# Patient Record
Sex: FEMALE | Race: WHITE | NOT HISPANIC OR LATINO | URBAN - METROPOLITAN AREA
[De-identification: names, ages, dates, MRNs, and addresses within clinical notes are randomized per-mention and may not be internally consistent; named-entity substitution may affect disease eponyms.]

---

## 2018-04-18 ENCOUNTER — OFFICE VISIT CONVERTED (OUTPATIENT)
Dept: GASTROENTEROLOGY | Facility: CLINIC | Age: 58
End: 2018-04-18
Attending: INTERNAL MEDICINE

## 2018-04-18 ENCOUNTER — CONVERSION ENCOUNTER (OUTPATIENT)
Dept: GASTROENTEROLOGY | Facility: CLINIC | Age: 58
End: 2018-04-18

## 2021-05-16 VITALS
SYSTOLIC BLOOD PRESSURE: 127 MMHG | RESPIRATION RATE: 12 BRPM | WEIGHT: 198.06 LBS | OXYGEN SATURATION: 99 % | DIASTOLIC BLOOD PRESSURE: 71 MMHG | HEIGHT: 62 IN | BODY MASS INDEX: 36.45 KG/M2 | HEART RATE: 95 BPM

## 2025-07-29 ENCOUNTER — APPOINTMENT (OUTPATIENT)
Dept: CT IMAGING | Facility: HOSPITAL | Age: 65
End: 2025-07-29
Payer: COMMERCIAL

## 2025-07-29 ENCOUNTER — HOSPITAL ENCOUNTER (INPATIENT)
Facility: HOSPITAL | Age: 65
LOS: 3 days | Discharge: HOME | End: 2025-08-01
Attending: EMERGENCY MEDICINE | Admitting: INTERNAL MEDICINE
Payer: COMMERCIAL

## 2025-07-29 ENCOUNTER — ANESTHESIA EVENT (OUTPATIENT)
Dept: PERIOP | Facility: HOSPITAL | Age: 65
End: 2025-07-29
Payer: COMMERCIAL

## 2025-07-29 ENCOUNTER — ANESTHESIA (OUTPATIENT)
Dept: PERIOP | Facility: HOSPITAL | Age: 65
End: 2025-07-29
Payer: COMMERCIAL

## 2025-07-29 DIAGNOSIS — Z78.9 DECREASED ACTIVITIES OF DAILY LIVING (ADL): ICD-10-CM

## 2025-07-29 DIAGNOSIS — R26.2 DIFFICULTY WALKING: ICD-10-CM

## 2025-07-29 DIAGNOSIS — K42.0 INCARCERATED UMBILICAL HERNIA: ICD-10-CM

## 2025-07-29 DIAGNOSIS — E87.20 LACTIC ACIDOSIS: ICD-10-CM

## 2025-07-29 DIAGNOSIS — K56.609 SMALL BOWEL OBSTRUCTION: Primary | ICD-10-CM

## 2025-07-29 PROBLEM — K51.90 ULCERATIVE COLITIS: Status: ACTIVE | Noted: 2025-07-29

## 2025-07-29 PROBLEM — D86.0 SARCOIDOSIS OF LUNG: Status: ACTIVE | Noted: 2025-07-29

## 2025-07-29 PROBLEM — D50.9 IRON DEFICIENCY ANEMIA: Status: ACTIVE | Noted: 2025-07-29

## 2025-07-29 PROBLEM — R16.0 LARGE LIVER: Status: ACTIVE | Noted: 2025-07-29

## 2025-07-29 PROBLEM — R94.5 ABNORMAL RESULTS OF LIVER FUNCTION STUDIES: Status: ACTIVE | Noted: 2025-07-29

## 2025-07-29 PROBLEM — M25.569 KNEE PAIN: Status: ACTIVE | Noted: 2025-07-29

## 2025-07-29 PROBLEM — C44.90 PRIMARY MALIGNANT NEOPLASM OF SKIN: Status: ACTIVE | Noted: 2025-07-29

## 2025-07-29 PROBLEM — D50.0 ANEMIA DUE TO CHRONIC BLOOD LOSS: Status: ACTIVE | Noted: 2025-07-29

## 2025-07-29 PROBLEM — M19.90 ARTHRITIS: Status: ACTIVE | Noted: 2025-07-29

## 2025-07-29 PROBLEM — M25.50 JOINT PAIN: Status: ACTIVE | Noted: 2025-07-29

## 2025-07-29 LAB
ALBUMIN SERPL-MCNC: 5 G/DL (ref 3.5–5.2)
ALBUMIN/GLOB SERPL: 1.4 G/DL
ALP SERPL-CCNC: 151 U/L (ref 39–117)
ALT SERPL W P-5'-P-CCNC: 50 U/L (ref 1–33)
ANION GAP SERPL CALCULATED.3IONS-SCNC: 18.4 MMOL/L (ref 5–15)
AST SERPL-CCNC: 39 U/L (ref 1–32)
BACTERIA UR QL AUTO: ABNORMAL /HPF
BASOPHILS # BLD AUTO: 0.05 10*3/MM3 (ref 0–0.2)
BASOPHILS NFR BLD AUTO: 0.3 % (ref 0–1.5)
BILIRUB SERPL-MCNC: 0.3 MG/DL (ref 0–1.2)
BILIRUB UR QL STRIP: NEGATIVE
BUN SERPL-MCNC: 17.2 MG/DL (ref 8–23)
BUN/CREAT SERPL: 19.1 (ref 7–25)
CALCIUM SPEC-SCNC: 10.5 MG/DL (ref 8.6–10.5)
CHLORIDE SERPL-SCNC: 100 MMOL/L (ref 98–107)
CLARITY UR: CLEAR
CO2 SERPL-SCNC: 22.6 MMOL/L (ref 22–29)
COLOR UR: YELLOW
CREAT SERPL-MCNC: 0.9 MG/DL (ref 0.57–1)
D-LACTATE SERPL-SCNC: 3.6 MMOL/L (ref 0.5–2)
D-LACTATE SERPL-SCNC: 5.1 MMOL/L (ref 0.5–2)
D-LACTATE SERPL-SCNC: 5.1 MMOL/L (ref 0.5–2)
DEPRECATED RDW RBC AUTO: 47 FL (ref 37–54)
EGFRCR SERPLBLD CKD-EPI 2021: 71.1 ML/MIN/1.73
EOSINOPHIL # BLD AUTO: 0 10*3/MM3 (ref 0–0.4)
EOSINOPHIL NFR BLD AUTO: 0 % (ref 0.3–6.2)
ERYTHROCYTE [DISTWIDTH] IN BLOOD BY AUTOMATED COUNT: 14.1 % (ref 12.3–15.4)
GLOBULIN UR ELPH-MCNC: 3.5 GM/DL
GLUCOSE BLDC GLUCOMTR-MCNC: 152 MG/DL (ref 70–99)
GLUCOSE SERPL-MCNC: 166 MG/DL (ref 65–99)
GLUCOSE UR STRIP-MCNC: ABNORMAL MG/DL
HCT VFR BLD AUTO: 45.6 % (ref 34–46.6)
HGB BLD-MCNC: 14.7 G/DL (ref 12–15.9)
HGB UR QL STRIP.AUTO: NEGATIVE
HOLD SPECIMEN: NORMAL
HOLD SPECIMEN: NORMAL
HYALINE CASTS UR QL AUTO: ABNORMAL /LPF
IMM GRANULOCYTES # BLD AUTO: 0.05 10*3/MM3 (ref 0–0.05)
IMM GRANULOCYTES NFR BLD AUTO: 0.3 % (ref 0–0.5)
KETONES UR QL STRIP: ABNORMAL
LEUKOCYTE ESTERASE UR QL STRIP.AUTO: ABNORMAL
LIPASE SERPL-CCNC: 34 U/L (ref 13–60)
LYMPHOCYTES # BLD AUTO: 0.68 10*3/MM3 (ref 0.7–3.1)
LYMPHOCYTES NFR BLD AUTO: 3.8 % (ref 19.6–45.3)
MCH RBC QN AUTO: 29.5 PG (ref 26.6–33)
MCHC RBC AUTO-ENTMCNC: 32.2 G/DL (ref 31.5–35.7)
MCV RBC AUTO: 91.4 FL (ref 79–97)
MONOCYTES # BLD AUTO: 0.55 10*3/MM3 (ref 0.1–0.9)
MONOCYTES NFR BLD AUTO: 3.1 % (ref 5–12)
NEUTROPHILS NFR BLD AUTO: 16.55 10*3/MM3 (ref 1.7–7)
NEUTROPHILS NFR BLD AUTO: 92.5 % (ref 42.7–76)
NITRITE UR QL STRIP: NEGATIVE
NRBC BLD AUTO-RTO: 0 /100 WBC (ref 0–0.2)
PH UR STRIP.AUTO: <=5 [PH] (ref 5–8)
PLATELET # BLD AUTO: 423 10*3/MM3 (ref 140–450)
PMV BLD AUTO: 9.5 FL (ref 6–12)
POTASSIUM SERPL-SCNC: 5 MMOL/L (ref 3.5–5.2)
PROT SERPL-MCNC: 8.5 G/DL (ref 6–8.5)
PROT UR QL STRIP: ABNORMAL
RBC # BLD AUTO: 4.99 10*6/MM3 (ref 3.77–5.28)
RBC # UR STRIP: ABNORMAL /HPF
REF LAB TEST METHOD: ABNORMAL
SODIUM SERPL-SCNC: 141 MMOL/L (ref 136–145)
SP GR UR STRIP: >1.03 (ref 1–1.03)
SQUAMOUS #/AREA URNS HPF: ABNORMAL /HPF
TSH SERPL DL<=0.05 MIU/L-ACNC: 0.67 UIU/ML (ref 0.27–4.2)
UROBILINOGEN UR QL STRIP: ABNORMAL
WBC # UR STRIP: ABNORMAL /HPF
WBC NRBC COR # BLD AUTO: 17.88 10*3/MM3 (ref 3.4–10.8)
WHOLE BLOOD HOLD COAG: NORMAL
WHOLE BLOOD HOLD SPECIMEN: NORMAL

## 2025-07-29 PROCEDURE — 82948 REAGENT STRIP/BLOOD GLUCOSE: CPT | Performed by: NURSE ANESTHETIST, CERTIFIED REGISTERED

## 2025-07-29 PROCEDURE — 25010000002 CEFOXITIN PER 1 G: Performed by: STUDENT IN AN ORGANIZED HEALTH CARE EDUCATION/TRAINING PROGRAM

## 2025-07-29 PROCEDURE — 25010000002 SUGAMMADEX 200 MG/2ML SOLUTION: Performed by: NURSE ANESTHETIST, CERTIFIED REGISTERED

## 2025-07-29 PROCEDURE — 99285 EMERGENCY DEPT VISIT HI MDM: CPT

## 2025-07-29 PROCEDURE — 49594 RPR AA HRN 1ST 3-10 NCR/STRN: CPT

## 2025-07-29 PROCEDURE — 36415 COLL VENOUS BLD VENIPUNCTURE: CPT | Performed by: EMERGENCY MEDICINE

## 2025-07-29 PROCEDURE — 99222 1ST HOSP IP/OBS MODERATE 55: CPT | Performed by: STUDENT IN AN ORGANIZED HEALTH CARE EDUCATION/TRAINING PROGRAM

## 2025-07-29 PROCEDURE — 25010000002 LIDOCAINE 1% - EPINEPHRINE 1:100000 1 %-1:100000 SOLUTION: Performed by: STUDENT IN AN ORGANIZED HEALTH CARE EDUCATION/TRAINING PROGRAM

## 2025-07-29 PROCEDURE — 25810000003 SODIUM CHLORIDE 0.9 % SOLUTION: Performed by: EMERGENCY MEDICINE

## 2025-07-29 PROCEDURE — 25810000003 LACTATED RINGERS PER 1000 ML: Performed by: STUDENT IN AN ORGANIZED HEALTH CARE EDUCATION/TRAINING PROGRAM

## 2025-07-29 PROCEDURE — 25010000002 PROPOFOL 10 MG/ML EMULSION: Performed by: NURSE ANESTHETIST, CERTIFIED REGISTERED

## 2025-07-29 PROCEDURE — 25010000002 DEXAMETHASONE PER 1 MG: Performed by: NURSE ANESTHETIST, CERTIFIED REGISTERED

## 2025-07-29 PROCEDURE — 25010000002 FENTANYL CITRATE (PF) 50 MCG/ML SOLUTION: Performed by: NURSE ANESTHETIST, CERTIFIED REGISTERED

## 2025-07-29 PROCEDURE — 25010000002 FAMOTIDINE 10 MG/ML SOLUTION: Performed by: EMERGENCY MEDICINE

## 2025-07-29 PROCEDURE — 25010000002 MIDAZOLAM PER 1MG: Performed by: ANESTHESIOLOGY

## 2025-07-29 PROCEDURE — 81001 URINALYSIS AUTO W/SCOPE: CPT | Performed by: EMERGENCY MEDICINE

## 2025-07-29 PROCEDURE — 99223 1ST HOSP IP/OBS HIGH 75: CPT | Performed by: INTERNAL MEDICINE

## 2025-07-29 PROCEDURE — 25010000002 ONDANSETRON PER 1 MG: Performed by: EMERGENCY MEDICINE

## 2025-07-29 PROCEDURE — 25810000003 LACTATED RINGERS PER 1000 ML: Performed by: ANESTHESIOLOGY

## 2025-07-29 PROCEDURE — 25010000002 HYDROMORPHONE 1 MG/ML SOLUTION: Performed by: EMERGENCY MEDICINE

## 2025-07-29 PROCEDURE — 25010000002 PHENYLEPHRINE HCL-NACL 1000-0.9 MCG/10ML-% SOLUTION PREFILLED SYRINGE: Performed by: NURSE ANESTHETIST, CERTIFIED REGISTERED

## 2025-07-29 PROCEDURE — 83605 ASSAY OF LACTIC ACID: CPT

## 2025-07-29 PROCEDURE — 49594 RPR AA HRN 1ST 3-10 NCR/STRN: CPT | Performed by: STUDENT IN AN ORGANIZED HEALTH CARE EDUCATION/TRAINING PROGRAM

## 2025-07-29 PROCEDURE — 74177 CT ABD & PELVIS W/CONTRAST: CPT

## 2025-07-29 PROCEDURE — 25010000002 LIDOCAINE PF 2% 2 % SOLUTION: Performed by: NURSE ANESTHETIST, CERTIFIED REGISTERED

## 2025-07-29 PROCEDURE — 80050 GENERAL HEALTH PANEL: CPT | Performed by: INTERNAL MEDICINE

## 2025-07-29 PROCEDURE — 83605 ASSAY OF LACTIC ACID: CPT | Performed by: EMERGENCY MEDICINE

## 2025-07-29 PROCEDURE — 94799 UNLISTED PULMONARY SVC/PX: CPT

## 2025-07-29 PROCEDURE — 0WUF0JZ SUPPLEMENT ABDOMINAL WALL WITH SYNTHETIC SUBSTITUTE, OPEN APPROACH: ICD-10-PCS | Performed by: STUDENT IN AN ORGANIZED HEALTH CARE EDUCATION/TRAINING PROGRAM

## 2025-07-29 PROCEDURE — 83690 ASSAY OF LIPASE: CPT | Performed by: EMERGENCY MEDICINE

## 2025-07-29 PROCEDURE — 25010000002 METOCLOPRAMIDE PER 10 MG: Performed by: ANESTHESIOLOGY

## 2025-07-29 PROCEDURE — 25010000002 ONDANSETRON PER 1 MG: Performed by: NURSE ANESTHETIST, CERTIFIED REGISTERED

## 2025-07-29 PROCEDURE — 25010000002 HYDROMORPHONE 1 MG/ML SOLUTION: Performed by: NURSE ANESTHETIST, CERTIFIED REGISTERED

## 2025-07-29 PROCEDURE — 25510000001 IOPAMIDOL PER 1 ML: Performed by: EMERGENCY MEDICINE

## 2025-07-29 DEVICE — LIGACLIP MCA MULTIPLE CLIP APPLIERS, 20 LARGE CLIPS
Type: IMPLANTABLE DEVICE | Site: ABDOMEN | Status: FUNCTIONAL
Brand: LIGACLIP

## 2025-07-29 DEVICE — LIGACLIP MCA MULTIPLE CLIP APPLIERS, 20 SMALL CLIPS
Type: IMPLANTABLE DEVICE | Site: ABDOMEN | Status: FUNCTIONAL
Brand: LIGACLIP

## 2025-07-29 DEVICE — GRFT TISS STRATTICE FIRM 10X10CM: Type: IMPLANTABLE DEVICE | Site: ABDOMEN | Status: FUNCTIONAL

## 2025-07-29 RX ORDER — DEXAMETHASONE SODIUM PHOSPHATE 4 MG/ML
INJECTION, SOLUTION INTRA-ARTICULAR; INTRALESIONAL; INTRAMUSCULAR; INTRAVENOUS; SOFT TISSUE AS NEEDED
Status: DISCONTINUED | OUTPATIENT
Start: 2025-07-29 | End: 2025-07-29 | Stop reason: SURG

## 2025-07-29 RX ORDER — ACETAMINOPHEN 325 MG/1
650 TABLET ORAL EVERY 6 HOURS PRN
Status: DISCONTINUED | OUTPATIENT
Start: 2025-07-29 | End: 2025-07-30

## 2025-07-29 RX ORDER — ASPIRIN 81 MG/1
81 TABLET ORAL AS NEEDED
COMMUNITY

## 2025-07-29 RX ORDER — SCOPOLAMINE 1 MG/3D
1 PATCH, EXTENDED RELEASE TRANSDERMAL ONCE
Status: DISCONTINUED | OUTPATIENT
Start: 2025-07-29 | End: 2025-08-01 | Stop reason: HOSPADM

## 2025-07-29 RX ORDER — PROMETHAZINE HYDROCHLORIDE 25 MG/1
25 SUPPOSITORY RECTAL ONCE AS NEEDED
Status: DISCONTINUED | OUTPATIENT
Start: 2025-07-29 | End: 2025-07-29 | Stop reason: HOSPADM

## 2025-07-29 RX ORDER — PROMETHAZINE HYDROCHLORIDE 25 MG/1
25 TABLET ORAL ONCE AS NEEDED
Status: DISCONTINUED | OUTPATIENT
Start: 2025-07-29 | End: 2025-07-29 | Stop reason: HOSPADM

## 2025-07-29 RX ORDER — HYDROCODONE BITARTRATE AND ACETAMINOPHEN 5; 325 MG/1; MG/1
1 TABLET ORAL EVERY 6 HOURS PRN
Status: DISCONTINUED | OUTPATIENT
Start: 2025-07-29 | End: 2025-07-30

## 2025-07-29 RX ORDER — DEXMEDETOMIDINE HYDROCHLORIDE 100 UG/ML
INJECTION, SOLUTION INTRAVENOUS AS NEEDED
Status: DISCONTINUED | OUTPATIENT
Start: 2025-07-29 | End: 2025-07-29 | Stop reason: SURG

## 2025-07-29 RX ORDER — SODIUM CHLORIDE 0.9 % (FLUSH) 0.9 %
10 SYRINGE (ML) INJECTION AS NEEDED
Status: DISCONTINUED | OUTPATIENT
Start: 2025-07-29 | End: 2025-08-01 | Stop reason: HOSPADM

## 2025-07-29 RX ORDER — SODIUM CHLORIDE, SODIUM LACTATE, POTASSIUM CHLORIDE, CALCIUM CHLORIDE 600; 310; 30; 20 MG/100ML; MG/100ML; MG/100ML; MG/100ML
70 INJECTION, SOLUTION INTRAVENOUS CONTINUOUS
Status: DISCONTINUED | OUTPATIENT
Start: 2025-07-29 | End: 2025-07-31

## 2025-07-29 RX ORDER — DAPAGLIFLOZIN 5 MG/1
5 TABLET, FILM COATED ORAL DAILY
COMMUNITY
Start: 2025-05-29

## 2025-07-29 RX ORDER — PROPOFOL 10 MG/ML
VIAL (ML) INTRAVENOUS AS NEEDED
Status: DISCONTINUED | OUTPATIENT
Start: 2025-07-29 | End: 2025-07-29 | Stop reason: SURG

## 2025-07-29 RX ORDER — ONDANSETRON 2 MG/ML
4 INJECTION INTRAMUSCULAR; INTRAVENOUS ONCE
Status: COMPLETED | OUTPATIENT
Start: 2025-07-29 | End: 2025-07-29

## 2025-07-29 RX ORDER — NICOTINE 21 MG/24HR
1 PATCH, TRANSDERMAL 24 HOURS TRANSDERMAL DAILY PRN
Status: DISCONTINUED | OUTPATIENT
Start: 2025-07-29 | End: 2025-08-01

## 2025-07-29 RX ORDER — FENTANYL CITRATE 50 UG/ML
INJECTION, SOLUTION INTRAMUSCULAR; INTRAVENOUS AS NEEDED
Status: DISCONTINUED | OUTPATIENT
Start: 2025-07-29 | End: 2025-07-29 | Stop reason: SURG

## 2025-07-29 RX ORDER — ONDANSETRON 2 MG/ML
4 INJECTION INTRAMUSCULAR; INTRAVENOUS EVERY 4 HOURS PRN
Status: DISCONTINUED | OUTPATIENT
Start: 2025-07-29 | End: 2025-08-01 | Stop reason: HOSPADM

## 2025-07-29 RX ORDER — PHENYLEPHRINE HCL IN 0.9% NACL 1 MG/10 ML
SYRINGE (ML) INTRAVENOUS AS NEEDED
Status: DISCONTINUED | OUTPATIENT
Start: 2025-07-29 | End: 2025-07-29 | Stop reason: SURG

## 2025-07-29 RX ORDER — LIDOCAINE 4 G/G
1 PATCH TOPICAL DAILY PRN
Status: DISCONTINUED | OUTPATIENT
Start: 2025-07-29 | End: 2025-08-01 | Stop reason: HOSPADM

## 2025-07-29 RX ORDER — LIDOCAINE 5 G/100G
1 CREAM RECTAL; TOPICAL 3 TIMES DAILY PRN
Status: DISCONTINUED | OUTPATIENT
Start: 2025-07-29 | End: 2025-08-01 | Stop reason: HOSPADM

## 2025-07-29 RX ORDER — FAMOTIDINE 10 MG/ML
20 INJECTION, SOLUTION INTRAVENOUS ONCE
Status: COMPLETED | OUTPATIENT
Start: 2025-07-29 | End: 2025-07-29

## 2025-07-29 RX ORDER — ALUMINA, MAGNESIA, AND SIMETHICONE 2400; 2400; 240 MG/30ML; MG/30ML; MG/30ML
15 SUSPENSION ORAL EVERY 6 HOURS PRN
Status: DISCONTINUED | OUTPATIENT
Start: 2025-07-29 | End: 2025-07-30

## 2025-07-29 RX ORDER — HYDROXYZINE HYDROCHLORIDE 25 MG/1
25 TABLET, FILM COATED ORAL 3 TIMES DAILY PRN
Status: DISCONTINUED | OUTPATIENT
Start: 2025-07-29 | End: 2025-07-30

## 2025-07-29 RX ORDER — SODIUM CHLORIDE 9 MG/ML
40 INJECTION, SOLUTION INTRAVENOUS AS NEEDED
Status: DISCONTINUED | OUTPATIENT
Start: 2025-07-29 | End: 2025-08-01 | Stop reason: HOSPADM

## 2025-07-29 RX ORDER — ACETAMINOPHEN 500 MG
500 TABLET ORAL AS NEEDED
COMMUNITY
End: 2025-08-01 | Stop reason: HOSPADM

## 2025-07-29 RX ORDER — IOPAMIDOL 755 MG/ML
100 INJECTION, SOLUTION INTRAVASCULAR
Status: COMPLETED | OUTPATIENT
Start: 2025-07-29 | End: 2025-07-29

## 2025-07-29 RX ORDER — ACETAMINOPHEN 10 MG/ML
1000 INJECTION, SOLUTION INTRAVENOUS EVERY 8 HOURS PRN
Status: ACTIVE | OUTPATIENT
Start: 2025-07-29 | End: 2025-07-31

## 2025-07-29 RX ORDER — SODIUM CHLORIDE, SODIUM LACTATE, POTASSIUM CHLORIDE, CALCIUM CHLORIDE 600; 310; 30; 20 MG/100ML; MG/100ML; MG/100ML; MG/100ML
20 INJECTION, SOLUTION INTRAVENOUS CONTINUOUS PRN
Status: DISCONTINUED | OUTPATIENT
Start: 2025-07-29 | End: 2025-07-30

## 2025-07-29 RX ORDER — LIDOCAINE HYDROCHLORIDE AND EPINEPHRINE 10; 10 MG/ML; UG/ML
INJECTION, SOLUTION INFILTRATION; PERINEURAL AS NEEDED
Status: DISCONTINUED | OUTPATIENT
Start: 2025-07-29 | End: 2025-07-29 | Stop reason: HOSPADM

## 2025-07-29 RX ORDER — LIDOCAINE HYDROCHLORIDE 20 MG/ML
INJECTION, SOLUTION EPIDURAL; INFILTRATION; INTRACAUDAL; PERINEURAL AS NEEDED
Status: DISCONTINUED | OUTPATIENT
Start: 2025-07-29 | End: 2025-07-29 | Stop reason: SURG

## 2025-07-29 RX ORDER — SODIUM CHLORIDE 0.9 % (FLUSH) 0.9 %
10 SYRINGE (ML) INJECTION EVERY 12 HOURS SCHEDULED
Status: DISCONTINUED | OUTPATIENT
Start: 2025-07-29 | End: 2025-08-01 | Stop reason: HOSPADM

## 2025-07-29 RX ORDER — MIDAZOLAM HYDROCHLORIDE 2 MG/2ML
2 INJECTION, SOLUTION INTRAMUSCULAR; INTRAVENOUS ONCE
Status: COMPLETED | OUTPATIENT
Start: 2025-07-29 | End: 2025-07-29

## 2025-07-29 RX ORDER — OXYCODONE HYDROCHLORIDE 5 MG/1
5 TABLET ORAL
Status: DISCONTINUED | OUTPATIENT
Start: 2025-07-29 | End: 2025-07-29 | Stop reason: HOSPADM

## 2025-07-29 RX ORDER — ROCURONIUM BROMIDE 10 MG/ML
INJECTION, SOLUTION INTRAVENOUS AS NEEDED
Status: DISCONTINUED | OUTPATIENT
Start: 2025-07-29 | End: 2025-07-29 | Stop reason: SURG

## 2025-07-29 RX ORDER — ONDANSETRON 2 MG/ML
INJECTION INTRAMUSCULAR; INTRAVENOUS AS NEEDED
Status: DISCONTINUED | OUTPATIENT
Start: 2025-07-29 | End: 2025-07-29 | Stop reason: SURG

## 2025-07-29 RX ORDER — LEVOTHYROXINE SODIUM 100 MCG
100 TABLET ORAL DAILY
COMMUNITY

## 2025-07-29 RX ORDER — ONDANSETRON 2 MG/ML
4 INJECTION INTRAMUSCULAR; INTRAVENOUS ONCE AS NEEDED
Status: DISCONTINUED | OUTPATIENT
Start: 2025-07-29 | End: 2025-07-29 | Stop reason: HOSPADM

## 2025-07-29 RX ORDER — METOCLOPRAMIDE HYDROCHLORIDE 5 MG/ML
10 INJECTION INTRAMUSCULAR; INTRAVENOUS
Status: COMPLETED | OUTPATIENT
Start: 2025-07-29 | End: 2025-07-29

## 2025-07-29 RX ADMIN — HYDROMORPHONE HYDROCHLORIDE 0.5 MG: 1 INJECTION, SOLUTION INTRAMUSCULAR; INTRAVENOUS; SUBCUTANEOUS at 17:43

## 2025-07-29 RX ADMIN — PROPOFOL 10 MG: 10 INJECTION, EMULSION INTRAVENOUS at 16:29

## 2025-07-29 RX ADMIN — FAMOTIDINE 20 MG: 10 INJECTION INTRAVENOUS at 12:12

## 2025-07-29 RX ADMIN — FENTANYL CITRATE 50 MCG: 50 INJECTION, SOLUTION INTRAMUSCULAR; INTRAVENOUS at 16:10

## 2025-07-29 RX ADMIN — Medication 200 MCG: at 15:52

## 2025-07-29 RX ADMIN — FENTANYL CITRATE 50 MCG: 50 INJECTION, SOLUTION INTRAMUSCULAR; INTRAVENOUS at 15:49

## 2025-07-29 RX ADMIN — ONDANSETRON 4 MG: 2 INJECTION, SOLUTION INTRAMUSCULAR; INTRAVENOUS at 14:13

## 2025-07-29 RX ADMIN — Medication 200 MCG: at 15:55

## 2025-07-29 RX ADMIN — Medication 100 MCG: at 15:58

## 2025-07-29 RX ADMIN — METOCLOPRAMIDE 10 MG: 5 INJECTION, SOLUTION INTRAMUSCULAR; INTRAVENOUS at 15:35

## 2025-07-29 RX ADMIN — PROPOFOL 20 MG: 10 INJECTION, EMULSION INTRAVENOUS at 16:16

## 2025-07-29 RX ADMIN — CEFOXITIN 2000 MG: 2 INJECTION, POWDER, FOR SOLUTION INTRAVENOUS at 15:34

## 2025-07-29 RX ADMIN — PROPOFOL 200 MG: 10 INJECTION, EMULSION INTRAVENOUS at 15:49

## 2025-07-29 RX ADMIN — PROPOFOL 10 MG: 10 INJECTION, EMULSION INTRAVENOUS at 16:40

## 2025-07-29 RX ADMIN — SODIUM CHLORIDE, POTASSIUM CHLORIDE, SODIUM LACTATE AND CALCIUM CHLORIDE 20 ML/HR: 600; 310; 30; 20 INJECTION, SOLUTION INTRAVENOUS at 15:18

## 2025-07-29 RX ADMIN — SODIUM CHLORIDE, POTASSIUM CHLORIDE, SODIUM LACTATE AND CALCIUM CHLORIDE: 600; 310; 30; 20 INJECTION, SOLUTION INTRAVENOUS at 16:44

## 2025-07-29 RX ADMIN — SUGAMMADEX 200 MG: 100 INJECTION, SOLUTION INTRAVENOUS at 16:50

## 2025-07-29 RX ADMIN — SODIUM CHLORIDE, POTASSIUM CHLORIDE, SODIUM LACTATE AND CALCIUM CHLORIDE 100 ML/HR: 600; 310; 30; 20 INJECTION, SOLUTION INTRAVENOUS at 21:36

## 2025-07-29 RX ADMIN — DEXAMETHASONE SODIUM PHOSPHATE 8 MG: 4 INJECTION, SOLUTION INTRA-ARTICULAR; INTRALESIONAL; INTRAMUSCULAR; INTRAVENOUS; SOFT TISSUE at 15:49

## 2025-07-29 RX ADMIN — ROCURONIUM BROMIDE 100 MG: 10 INJECTION, SOLUTION INTRAVENOUS at 15:49

## 2025-07-29 RX ADMIN — TOPICAL ANESTHETIC 1 SPRAY: 200 SPRAY DENTAL; PERIODONTAL at 14:20

## 2025-07-29 RX ADMIN — DEXMEDETOMIDINE 4 MCG: 100 INJECTION, SOLUTION INTRAVENOUS at 16:15

## 2025-07-29 RX ADMIN — HYDROMORPHONE HYDROCHLORIDE 1 MG: 1 INJECTION, SOLUTION INTRAMUSCULAR; INTRAVENOUS; SUBCUTANEOUS at 12:13

## 2025-07-29 RX ADMIN — SODIUM CHLORIDE 1000 ML: 9 INJECTION, SOLUTION INTRAVENOUS at 14:20

## 2025-07-29 RX ADMIN — MIDAZOLAM HYDROCHLORIDE 2 MG: 1 INJECTION, SOLUTION INTRAMUSCULAR; INTRAVENOUS at 15:35

## 2025-07-29 RX ADMIN — ONDANSETRON 4 MG: 2 INJECTION INTRAMUSCULAR; INTRAVENOUS at 16:49

## 2025-07-29 RX ADMIN — ONDANSETRON 4 MG: 2 INJECTION, SOLUTION INTRAMUSCULAR; INTRAVENOUS at 11:58

## 2025-07-29 RX ADMIN — HYDROMORPHONE HYDROCHLORIDE 0.5 MG: 1 INJECTION, SOLUTION INTRAMUSCULAR; INTRAVENOUS; SUBCUTANEOUS at 14:13

## 2025-07-29 RX ADMIN — HYDROMORPHONE HYDROCHLORIDE 0.5 MG: 1 INJECTION, SOLUTION INTRAMUSCULAR; INTRAVENOUS; SUBCUTANEOUS at 17:14

## 2025-07-29 RX ADMIN — DEXMEDETOMIDINE 4 MCG: 100 INJECTION, SOLUTION INTRAVENOUS at 16:25

## 2025-07-29 RX ADMIN — Medication 10 ML: at 21:55

## 2025-07-29 RX ADMIN — LIDOCAINE HYDROCHLORIDE 100 MG: 20 INJECTION, SOLUTION EPIDURAL; INFILTRATION; INTRACAUDAL; PERINEURAL at 15:49

## 2025-07-29 RX ADMIN — IOPAMIDOL 95 ML: 755 INJECTION, SOLUTION INTRAVENOUS at 12:43

## 2025-07-29 RX ADMIN — DEXMEDETOMIDINE 4 MCG: 100 INJECTION, SOLUTION INTRAVENOUS at 16:29

## 2025-07-29 RX ADMIN — DEXMEDETOMIDINE 4 MCG: 100 INJECTION, SOLUTION INTRAVENOUS at 16:35

## 2025-07-29 RX ADMIN — Medication 300 MCG: at 16:03

## 2025-07-29 RX ADMIN — SODIUM CHLORIDE 1000 ML: 9 INJECTION, SOLUTION INTRAVENOUS at 11:58

## 2025-07-29 RX ADMIN — DEXMEDETOMIDINE 4 MCG: 100 INJECTION, SOLUTION INTRAVENOUS at 16:40

## 2025-07-29 NOTE — ANESTHESIA PREPROCEDURE EVALUATION
Anesthesia Evaluation     Patient summary reviewed and Nursing notes reviewed   no history of anesthetic complications:   NPO Solid Status: > 8 hours  NPO Liquid Status: > 2 hours           Airway   Mallampati: III  TM distance: >3 FB  Neck ROM: full  Possible difficult intubation  Dental      Pulmonary - negative pulmonary ROS and normal exam    breath sounds clear to auscultation  Cardiovascular - negative cardio ROS and normal exam  Exercise tolerance: good (4-7 METS)    ECG reviewed  Rhythm: regular  Rate: normal        Neuro/Psych- negative ROS  GI/Hepatic/Renal/Endo    (+) hiatal hernia, liver disease (hepatomegaly), diabetes mellitus type 2, thyroid problem hypothyroidism    Musculoskeletal     Abdominal    Substance History - negative use     OB/GYN negative ob/gyn ROS         Other   arthritis,   history of cancer (skin cancer)    ROS/Med Hx Other: PAT Nursing Notes unavailable.           Latest Reference Range & Units 07/29/25 11:03   Sodium 136 - 145 mmol/L 141   Potassium 3.5 - 5.2 mmol/L 5.0   Chloride 98 - 107 mmol/L 100   CO2 22.0 - 29.0 mmol/L 22.6   Anion Gap 5.0 - 15.0 mmol/L 18.4 (H)   BUN 8.0 - 23.0 mg/dL 17.2   Creatinine 0.57 - 1.00 mg/dL 0.90   BUN/Creatinine Ratio 7.0 - 25.0  19.1   eGFR >60.0 mL/min/1.73 71.1   Glucose 65 - 99 mg/dL 166 (H)   Calcium 8.6 - 10.5 mg/dL 10.5   Alkaline Phosphatase 39 - 117 U/L 151 (H)   Total Protein 6.0 - 8.5 g/dL 8.5   Albumin 3.5 - 5.2 g/dL 5.0   Globulin gm/dL 3.5   A/G Ratio g/dL 1.4   AST (SGOT) 1 - 32 U/L 39 (H)   ALT (SGPT) 1 - 33 U/L 50 (H)   Total Bilirubin 0.0 - 1.2 mg/dL 0.3   (H): Data is abnormally high     Latest Reference Range & Units 07/29/25 11:03   Hemoglobin 12.0 - 15.9 g/dL 14.7   Hematocrit 34.0 - 46.6 % 45.6     IMPRESSION:  Impression:  1.Small bowel obstruction with transition point within a supraumbilical hernia. Recommend surgical consultation.  2.Hepatomegaly with diffuse fatty steatosis.           Anesthesia Plan    ASA 3 -  emergent     general     (Patient understands anesthesia not responsible for dental damage.    True SBO, pepcid/reglan preop, rsi, cricoid, suction, nasogastric tube placement; all risks/benefits discussed, pt has hair extensions that have small metal clips, will put on hair brandy x 2 to decrease likelihood of grouding    Will have preop nurse to drain ng tub prior to induction)  intravenous induction     Anesthetic plan, risks, benefits, and alternatives have been provided, discussed and informed consent has been obtained with: patient.    Use of blood products discussed with patient .    Plan discussed with CRNA.      CODE STATUS:    Code Status (Patient has no pulse and is not breathing): CPR (Attempt to Resuscitate)  Medical Interventions (Patient has pulse or is breathing): Full Support

## 2025-07-29 NOTE — NURSING NOTE
4 eyes on admission completed with Berkley Rouse RN. Surgical incision midline abdomen with dressing in place. No open wounds found. Blanchable redness noted scattered on the back. Patient encourage to change positions frequently.

## 2025-07-29 NOTE — H&P
[facility]    Date of admission: 7/29/2025  Patient Name: Yumiko Chatman   1960  Primary Care Physician:  Provider, No Known    Subjective     Chief Complaint   Patient presents with    Abdominal Pain     Abdominal pain with nausea and vomiting x 1 day        HPI:  65 y.o. Presented with acute onset of severe epigastric abdominal pain, nausea and vomiting that started last night.  Pain was initially more up in her upper abdomen and chest she thought was more related to heartburn. Initially had vomiting green in color later turned brownish.  Denies any history of SBO or obstruction.  Has a history of an umbilical hernia repair denies any other abdominal surgeries.  Has a history of UC does not take any medications for this apart from occasionally taken some Pepto-Bismol.  Denies any regular constipation usually has a bowel movement about every day.  Denies any sick contacts.  No fevers or chills.      PFSH notable for:     Ulcerative colitis  Hypothyroidism hepatomegaly  Osteoarthritis      Active Ambulatory Problems     Diagnosis Date Noted    Abnormal results of liver function studies 07/29/2025    Anemia due to chronic blood loss 07/29/2025    Arthritis 07/29/2025    Iron deficiency anemia 07/29/2025    Joint pain 07/29/2025    Knee pain 07/29/2025    Primary malignant neoplasm of skin 07/29/2025    Sarcoidosis of lung 07/29/2025    Tear of medial meniscus of left knee, initial encounter 11/12/2015    Ulcerative colitis 07/29/2025    Large liver 07/29/2025     Resolved Ambulatory Problems     Diagnosis Date Noted    No Resolved Ambulatory Problems     Past Medical History:   Diagnosis Date    Hiatal hernia     Hyperglycemia due to diabetes mellitus     Hypothyroidism        Past Surgical History:   Procedure Laterality Date    UMBILICAL HERNIA REPAIR          History reviewed. No pertinent family history.    Social History     Socioeconomic History    Marital status:  "         Objective     Vitals:   Temp:  [97.6 °F (36.4 °C)] 97.6 °F (36.4 °C)  Heart Rate:  [105-119] 119  Resp:  [16] 16  BP: (134-156)/(75-85) 137/77    Physical Exam   Awake conversant, morbidly obese  NG in place with yellow-greenish colored output  Nonlabored respirations on room air  Elevated rate regular rhythm no lower extremity pitting edema  Had pain medication semirecently, no guarding, has some pain near prior hernia site not rigid hypoactive bowel sounds, relatively comfortable appearing otherwise     Result Review    Vital signs, labs and any relevant imaging reviewed.     Assessment / Plan     SBO with transition point within a supraumbilical hernia  Hx of umbilical hernia repair  Lactic acidosis  Leukocytosis suspect reactive in setting of SBO  Morbid obesity BMI of 36  Mildly elevated transaminases suspect in setting of hepatomegaly and diffuse fatty steatosis per CT with history of \"enlarged liver and abnormal liver enzymes\"  Hypothyroidism    SBO with transition point, continue n.p.o., NG to low wall suction,  General surgery consulted, plan for OR  IV fluid bolus, initial lactic still elevated on repeat, but only had about 1 L, continue additional fluid bolus and then rate, suspected improved with surgical intervention  Leukocytosis suspect reactive (any antibiotics, no obvious infection seen on CT imaging  As needed antiemetics  IV pain medication as needed  Hepatomegaly with diffuse fatty steatosis likely in setting of patient's morbid obesity, has been told that she has some sort of abnormalities like this before and her LFTs have been mildly elevated intermittently in the past, denies any alcohol use, outpatient monitoring and weight loss discussed with patient  Urine not clean-catch with 13-20 squamous cells, no dysuria, defer antibiotics  Hold home Synthroid resume when able, will check TSH has been sometime apparently  Check am cbc, bmp, mg, phos LFTs  discussed initial management " and workup with ED provider  based on the above problems patient warrants inpatient admission hospitalization for continued evaluation, treatment and monitoring.  May ultimately need rehab depending on how she does postoperatively    VTE Prophylaxis:  Mechanical VTE prophylaxis orders are signed & held. Mechanical VTE prophylaxis orders are present.      Code: Full  Diet: Strict n.p.o.    CBC          7/29/2025    11:03   CBC   WBC 17.88    RBC 4.99    Hemoglobin 14.7    Hematocrit 45.6    MCV 91.4    MCH 29.5    MCHC 32.2    RDW 14.1    Platelets 423        CMP          7/29/2025    11:03   CMP   Glucose 166    BUN 17.2    Creatinine 0.90    EGFR 71.1    Sodium 141    Potassium 5.0    Chloride 100    Calcium 10.5    Total Protein 8.5    Albumin 5.0    Globulin 3.5    Total Bilirubin 0.3    Alkaline Phosphatase 151    AST (SGOT) 39    ALT (SGPT) 50    Albumin/Globulin Ratio 1.4    BUN/Creatinine Ratio 19.1    Anion Gap 18.4

## 2025-07-29 NOTE — CONSULTS
[facility]   Consult    Patient Name: Yumiko Chatman  : 1960  MRN: [id number]  Primary Care Physician:  Provider, No Known  Date of admission: 2025    Subjective   Subjective     Chief Complaint: Abdominal pain, nausea, vomiting    Consult Reason: Incarcerated umbilical hernia    HPI: Yumiko Chatman is a 65 y.o. female who presented to hospital after acute onset abdominal pain starting last evening.  She localizes her pain to the epigastric region and has had some associated emesis that has changed in color since onset.  She notes she last passed flatus yesterday and thinks she had a small smear of a bowel movement earlier today.  She is never had pain like this in the past.  Her previous abdominal surgical history includes an open primary repair of an umbilical hernia approximately 20 years ago.    Review of Systems   Constitutional:  Negative for chills and fever.   HENT:  Negative for sore throat.    Respiratory:  Negative for shortness of breath.    Cardiovascular:  Negative for chest pain.   Gastrointestinal:  Positive for abdominal distention, abdominal pain, nausea and vomiting.   Genitourinary:  Negative for difficulty urinating.   Neurological:  Negative for dizziness.   Psychiatric/Behavioral:  Negative for confusion.        Personal History     Past Medical History:   Diagnosis Date    Hiatal hernia     Hyperglycemia due to diabetes mellitus     Hypothyroidism     Large liver 2025    Ulcerative colitis 2025       Past Surgical History:   Procedure Laterality Date    UMBILICAL HERNIA REPAIR         Family History: family history is not on file. Otherwise pertinent FHx was reviewed and not pertinent to current issue.    Social History:      Home Medications:       Allergies:  Allergies   Allergen Reactions    Sulfa Antibiotics Nausea And Vomiting       Objective    Objective     Vitals:   Temp:  [97.6 °F (36.4 °C)] 97.6 °F (36.4 °C)  Heart Rate:  [105-119] 119  Resp:  [16]  16  BP: (134-156)/(75-85) 137/77    Physical Exam  Constitutional:       Appearance: Normal appearance.   HENT:      Head: Normocephalic and atraumatic.      Mouth/Throat:      Mouth: Mucous membranes are moist.      Pharynx: Oropharynx is clear.   Cardiovascular:      Rate and Rhythm: Normal rate and regular rhythm.   Pulmonary:      Effort: Pulmonary effort is normal. No respiratory distress.   Abdominal:      Comments: Soft, obese body habitus, supraumbilical tenderness with suspected incarcerated hernia   Musculoskeletal:         General: No swelling. Normal range of motion.      Cervical back: Normal range of motion and neck supple.   Skin:     General: Skin is warm and dry.   Neurological:      General: No focal deficit present.      Mental Status: She is alert and oriented to person, place, and time.   Psychiatric:         Mood and Affect: Mood normal.         Behavior: Behavior normal.         Result Review    Result Review:  I have personally reviewed the results from the time of this admission to 7/29/2025 14:36 EDT and agree with these findings:  [x]  Laboratory  []  Microbiology  [x]  Radiology  []  EKG/Telemetry   []  Cardiology/Vascular   []  Pathology  []  Old records  []  Other:  Most notable findings include: Incarcerated incisional hernia at the umbilicus    Assessment & Plan   Assessment / Plan     Brief Patient Summary:  Yumiko Chatman is a 65 y.o. female who presented with obstructive symptoms, found to have incarcerated supraumbilical hernia    Active Hospital Problems:  Active Hospital Problems    Diagnosis     **Incarcerated umbilical hernia        Plan:   Incisional hernia, incarcerated  - Afebrile, tachycardic, leukocytosis 17,000, lactic acidosis 5.1  - CT abdomen pelvis reviewed and consistent with incarcerated small bowel within a supraumbilical defect  -Admit to hospitalist service  - recommend operative management with hernia repair possible bowel resection and any other indicated  procedure  - Risks/benefits/alternatives of the procedure were explained to the patient and she was agreeable to proceeding    Electronically signed by Quintin Velasoc MD, 07/29/25, 2:36 PM EDT.

## 2025-07-29 NOTE — OP NOTE
UMBILICAL HERNIA REPAIR  Procedure Report    Patient Name:  Yumiko Chatman  YOB: 1960    Date of Surgery:  7/29/2025     Indications: Incarcerated incisional hernia at the umbilicus    Pre-op Diagnosis:   Incarcerated umbilical hernia [K42.0]       Post-Op Diagnosis Codes:     * Incarcerated umbilical hernia [K42.0]    Procedure/CPT® Codes:  No CPT Code Applied in Case Entry    Procedure(s):  Open incisional hernia repair (5 cm) with underlay biologic mesh placement    Staff:  Surgeon(s):  Quintin Velasco MD    Assistant: Anabell Clements RN CSA    Anesthesia: General    Estimated Blood Loss: minimal    Implants:    Implant Name Type Inv. Item Serial No.  Lot No. LRB No. Used Action   CLIPAPPLR M/ ENDO LIGACLIP 9 3/8IN SM - [id number] Implant CLIPAPPLR M/ ENDO LIGACLIP 9 3/8IN SM  [employer]  DIV OF J [employer] J [id number] N/A 1 Implanted   CLIPAPPLR M/ ENDO LIGACLIP 13IN LG - [id number] Implant CLIPAPPLR M/ ENDO LIGACLIP 13IN LG  [employer]  DIV OF J AND J [id number] N/A 1 Implanted   GRFT TISS STRATTICE FIRM [id number] - [id number] Implant GRFT TISS STRATTICE FIRM [id number]  [facility] [id number] N/A 1 Implanted       Specimen:          None        Findings: Small bowel obstruction from incarcerated incisional hernia above the umbilicus.  Grossly viable small bowel.  Hernia defect measured approximately 5 cm after dissection.    Complications: None apparent at the time of surgery    Description of Procedure: Informed consent was obtained before the patient was brought to the operating suite and placed in the supine position.  General anesthesia was induced maintained throughout the procedure.  The patient's abdomen was prepped and draped in the standard sterile fashion before a timeout procedure was performed, verifying the correct patient, procedure, and other pertinent information.    #15 blade scalpel, a midline incision was made just above the umbilicus.   Sharp dissection was carried down to the subcutaneous tissue until an incarcerated loop of small bowel was identified.  Fascial edges were identified and the hernia defect extended approximately 1 cm to gain entrance into the abdomen.  Adhesions to the anterior abdominal wall were then sharply taken down using Metzenbaum scissors allowing the small bowel to be reduced and the obstruction relieved.  There were small rents in the mesentery identified but no obvious full-thickness bowel ischemia.  Hemostasis was verified.  Finger sweep within the abdomen confirmed no separate hernia defects and no evidence of further adhesions to the anterior abdominal wall.  Decision was made for underlay mesh placement using biologic mesh.  Strattice biologic mesh was cut to fit and placed into the abdomen before being secured to the fascia circumferentially using #1 PDS in interrupted fashion.  Care was taken to ensure the mesh lied flat and was under appropriate tension.  Overlying fascia was then closed in the standard fashion using #1 double-stranded looped PDS.  Repair was palpated and felt intact.  Hemostasis was again verified.  Subcutaneous tissue was irrigated using warm saline and dried.  Skin was closed with staples before application of a sterile dressing over top to conclude the procedure.    Patient tolerated the procedure well and there were no immediate complications.  All counts were correct x 2 at the end of the procedure.    Disposition: Stable in PACU        The surgical first assist listed above assisted with all needed aspects of the procedure.    Electronically signed by Quintin Velasco MD, 07/29/25, 4:54 PM EDT.

## 2025-07-29 NOTE — Clinical Note
Level of Care: Telemetry [5]   Diagnosis: SBO (small bowel obstruction) [060069]   Admitting Physician: ARNOLD ALY [[id number]]   Attending Physician: ARNOLD ALY [[id number]]   Bed Request Comments: 5th floor if possible please   Certification: I Certify That Inpatient Hospital Services Are Medically Necessary For Greater Than 2 Midnights   disoriented to place/disoriented to person/disoriented to time/situation

## 2025-07-29 NOTE — PLAN OF CARE
Goal Outcome Evaluation:  Plan of Care Reviewed With: patient        Progress: improving  Outcome Evaluation: AOx4, VSS, up x1-2 assist. NG to LWS. Cont pulse ox. SCDs on. Pain controlled per MAR. Patient arrived to unit at 1825. Admission completed.

## 2025-07-29 NOTE — ED PROVIDER NOTES
"Time: 12:03 PM EDT  Date of encounter:  7/29/2025  Independent Historian/Clinical History and Information was obtained by:   Patient and Family    History is limited by: N/A    Chief Complaint: Severe nausea and vomiting and epigastric abdominal pain and bloating since last night.      History of Present Illness:  Patient is a 65 y.o. year old female who presents to the emergency department for evaluation of acute onset of severe epigastric abdominal pain and some bloating noted and nausea and vomiting since last night.    She states initially the vomiting was somewhat bilious but now looks brownish in color.    No history of bowel blockage or obstruction.    She still retains her gallbladder and appendix but has had previous umbilical hernia repair.    No dysuria or urinary frequency.  No fevers reported but she has had a headache the last couple of days and \"feels flushed\".    No sick contacts.      Patient Care Team  Primary Care Provider: Provider, No Known    Past Medical History:   Umbilical hernia repair.  Allergies   Allergen Reactions    Sulfa Antibiotics Nausea And Vomiting     Past Medical History:   Diagnosis Date    Abnormal results of liver function studies 07/29/2025    Anemia due to chronic blood loss 07/29/2025    Arthritis 07/29/2025    BMI 36.0-36.9,adult     Hiatal hernia     Hyperglycemia due to diabetes mellitus     Hypothyroidism     Iron deficiency anemia 07/29/2025    Joint pain 07/29/2025    Knee pain 07/29/2025    Large liver 07/29/2025    Primary malignant neoplasm of skin 07/29/2025    Sarcoidosis of lung 07/29/2025    Tear of medial meniscus of left knee, initial encounter 11/12/2015    Ulcerative colitis 07/29/2025     Past Surgical History:   Procedure Laterality Date    UMBILICAL HERNIA REPAIR       Family History   Problem Relation Age of Onset    No Known Problems Mother     No Known Problems Father        Home Medications:  Prior to Admission medications    Not on File    " "    Social History:   Social History     Tobacco Use    Smoking status: Never    Smokeless tobacco: Never   Vaping Use    Vaping status: Never Used   Substance Use Topics    Alcohol use: Not Currently    Drug use: Never     Lives at home, no drug use reported.    Review of Systems:  Review of Systems   I performed a 10 point review of systems which was all negative, except for the positives found in the HPI above.  Physical Exam:  /74 (BP Location: Right arm, Patient Position: Lying)   Pulse 105   Temp 98.5 °F (36.9 °C) (Oral)   Resp 18   Ht 157.5 cm (62\")   Wt 90 kg (198 lb 6.6 oz)   LMP  (LMP Unknown)   SpO2 94%   BMI 36.29 kg/m²     Physical Exam   General: Awake alert and in severe distress due to upper abdominal pain and nausea    HEENT: Head normocephalic atraumatic, eyes PERRLA EOMI, nose normal, oropharynx normal.  Mucous membranes somewhat dry, dehydrated    Neck: Supple full range of motion, no meningismus, no lymphadenopathy    Heart: Tachycardic with regular rhythm, no murmurs or rubs, 2+ radial pulses bilaterally    Lungs: Clear to auscultation bilaterally without wheezes or crackles, no respiratory distress    Abdomen: Soft, moderately tender only in the epigastrium with some distention present, no localizing tenderness in any other quadrant, no generalized peritonitis, nondistended, no rebound or guarding    Skin: Warm, dry, no rash    Musculoskeletal: Normal range of motion, no lower extremity edema    Neurologic: Oriented x3, no motor deficits no sensory deficits    Psychiatric: Mood appears stable, no psychosis            Medical Decision Making:      Comorbidities that affect care:    Hiatal hernia history of ulcerative colitis, diabetes    External Notes reviewed:    None      The following orders were placed and all results were independently analyzed by me:  Orders Placed This Encounter   Procedures    CT Abdomen Pelvis With Contrast    [place] Draw    Comprehensive Metabolic " Panel    Lipase    Urinalysis With Microscopic If Indicated (No Culture) - Urine, Clean Catch    Lactic Acid, Plasma    CBC Auto Differential    STAT Lactic Acid, Reflex    Urinalysis, Microscopic Only - Urine, Clean Catch    STAT Lactic Acid, Reflex    Magnesium    Phosphorus    Comprehensive Metabolic Panel    Protime-INR    TSH Rfx On Abnormal To Free T4    NPO Diet NPO Type: Strict NPO    Undress & Gown    Nasogastric tube insertion    Low Wall Suction    Verify / Perform Chlorhexidine Skin Prep    Perform Chlorhexidine Skin Prep Night Before and Morning of Procedure    Place Sequential Compression Device    Maintain Sequential Compression Device    Vital Signs    Notify Provider (With Default Parameters)    Activity - Ad Tana    Ambulate Patient    Up in Chair    Intake & Output    Weigh Patient    Oral Care    Saline Lock & Maintain IV Access    Place Sequential Compression Device    Maintain Sequential Compression Device    Please open fluids wide open for bolus, please drain ng tube prior to induction  Nursing Communication    Continuous Pulse Oximetry    NG Tube to Low Wall Suction    Code Status and Medical Interventions: CPR (Attempt to Resuscitate); Full Support    Surgery (on-call MD unless specified)    Hospitalist (on-call MD unless specified)    POC Glucose STAT    Insert Peripheral IV    Insert Peripheral IV    Insert Peripheral IV    Inpatient Admission    CBC & Differential    Green Top (Gel)    Lavender Top    Gold Top - SST    Light Blue Top    CBC & Differential    Send To OR / Endo       Medications Given in the Emergency Department:  Medications   sodium chloride 0.9 % flush 10 mL ( Intravenous MAR Unhold 7/29/25 1811)   sodium chloride 0.9 % flush 10 mL ( Intravenous MAR Hold 7/29/25 1508)   melatonin tablet 5 mg ( Oral MAR Unhold 7/29/25 1811)   aluminum-magnesium hydroxide-simethicone (MAALOX MAX) 400-400-40 MG/5ML suspension 15 mL ( Oral MAR Unhold 7/29/25 1811)   nicotine (NICODERM CQ) 21  MG/24HR patch 1 patch ( Transdermal MAR Unhold 7/29/25 1811)   hydrOXYzine (ATARAX) tablet 25 mg ( Oral MAR Unhold 7/29/25 1811)   ondansetron (ZOFRAN) injection 4 mg ( Intravenous MAR Unhold 7/29/25 1811)   acetaminophen (TYLENOL) tablet 650 mg ( Oral MAR Unhold 7/29/25 1811)   Diclofenac Sodium (VOLTAREN) 1 % gel 2 g ( Topical MAR Unhold 7/29/25 1811)   Lidocaine 4 % 1 patch ( Transdermal MAR Unhold 7/29/25 1811)   Lidocaine (Anorectal) (LMX 5) 5 % cream cream 1 Application ( Topical MAR Unhold 7/29/25 1811)   benzocaine-menthol (CHLORASEPTIC) lozenge 1 each ( Mouth/Throat MAR Unhold 7/29/25 1811)   artificial tears ophthalmic ointment ( Both Eyes MAR Unhold 7/29/25 1811)   HYDROmorphone (DILAUDID) injection 0.5 mg ( Intravenous MAR Unhold 7/29/25 1811)   acetaminophen (OFIRMEV) injection 1,000 mg ( Intravenous MAR Unhold 7/29/25 1811)   sodium chloride 0.9 % flush 10 mL ( Intravenous MAR Unhold 7/29/25 1811)   sodium chloride 0.9 % flush 10 mL ( Intravenous MAR Unhold 7/29/25 1811)   sodium chloride 0.9 % infusion 40 mL ( Intravenous MAR Unhold 7/29/25 1811)   lactated ringers infusion ( Intravenous Stopped 7/29/25 1659)   lactated ringers infusion (100 mL/hr Intravenous Currently Infusing 7/29/25 1818)   scopolamine patch 1 mg/72 hr (has no administration in time range)   HYDROcodone-acetaminophen (NORCO) 5-325 MG per tablet 1 tablet (has no administration in time range)   ondansetron (ZOFRAN) injection 4 mg (4 mg Intravenous Given 7/29/25 1158)   sodium chloride 0.9 % bolus 1,000 mL (0 mL Intravenous Stopped 7/29/25 1247)   HYDROmorphone (DILAUDID) injection 1 mg (1 mg Intravenous Given 7/29/25 1213)   famotidine (PEPCID) injection 20 mg (20 mg Intravenous Given 7/29/25 1212)   iopamidol (ISOVUE-370) 76 % injection 100 mL (95 mL Intravenous Given 7/29/25 1243)   HYDROmorphone (DILAUDID) injection 0.5 mg (0.5 mg Intravenous Given 7/29/25 1413)   ondansetron (ZOFRAN) injection 4 mg (4 mg Intravenous Given 7/29/25  1413)   sodium chloride 0.9 % bolus 1,000 mL ( Intravenous Currently Infusing 7/29/25 1501)   benzocaine (HURRICAINE) 20 % liquid solution 1 spray (1 spray Mouth/Throat Given 7/29/25 1420)   cefOXItin (MEFOXIN) 2,000 mg in sodium chloride 0.9 % 100 mL IVPB-VTB (2,000 mg Intravenous New Bag 7/29/25 1534)   Midazolam HCl (PF) (VERSED) injection 2 mg (2 mg Intravenous Given 7/29/25 1535)   metoclopramide (REGLAN) injection 10 mg (10 mg Intravenous Given 7/29/25 1535)   HYDROmorphone (DILAUDID) injection 0.5 mg (0.5 mg Intravenous Given 7/29/25 1743)        ED Course:         Labs:    Lab Results (last 24 hours)       Procedure Component Value Units Date/Time    CBC & Differential [[id number]]  (Abnormal) Collected: 07/29/25 1103    Specimen: Blood Updated: 07/29/25 1109    Narrative:      The following orders were created for panel order CBC & Differential.  Procedure                               Abnormality         Status                     ---------                               -----------         ------                     CBC Auto Differential[[id number]]        Abnormal            Final result                 Please view results for these tests on the individual orders.    Comprehensive Metabolic Panel [[id number]]  (Abnormal) Collected: 07/29/25 1103    Specimen: Blood Updated: 07/29/25 1131     Glucose 166 mg/dL      BUN 17.2 mg/dL      Creatinine 0.90 mg/dL      Sodium 141 mmol/L      Potassium 5.0 mmol/L      Chloride 100 mmol/L      CO2 22.6 mmol/L      Calcium 10.5 mg/dL      Total Protein 8.5 g/dL      Albumin 5.0 g/dL      ALT (SGPT) 50 U/L      AST (SGOT) 39 U/L      Alkaline Phosphatase 151 U/L      Total Bilirubin 0.3 mg/dL      Globulin 3.5 gm/dL      A/G Ratio 1.4 g/dL      BUN/Creatinine Ratio 19.1     Anion Gap 18.4 mmol/L      eGFR 71.1 mL/min/1.73     Narrative:      GFR Categories in Chronic Kidney Disease (CKD)              GFR Category          GFR (mL/min/1.73)    Interpretation  G1                     90 or greater        Normal or high (1)  G2                    60-89                Mild decrease (1)  G3a                   45-59                Mild to moderate decrease  G3b                   30-44                Moderate to severe decrease  G4                    15-29                Severe decrease  G5                    14 or less           Kidney failure    (1)In the absence of evidence of kidney disease, neither GFR category G1 or G2 fulfill the criteria for CKD.    eGFR calculation 2021 CKD-EPI creatinine equation, which does not include race as a factor    Lipase [[id number]]  (Normal) Collected: 07/29/25 1103    Specimen: Blood Updated: 07/29/25 1131     Lipase 34 U/L     Lactic Acid, Plasma [[id number]]  (Abnormal) Collected: 07/29/25 1103    Specimen: Blood Updated: 07/29/25 1140     Lactate 5.1 mmol/L     CBC Auto Differential [[id number]]  (Abnormal) Collected: 07/29/25 1103    Specimen: Blood Updated: 07/29/25 1109     WBC 17.88 10*3/mm3      RBC 4.99 10*6/mm3      Hemoglobin 14.7 g/dL      Hematocrit 45.6 %      MCV 91.4 fL      MCH 29.5 pg      MCHC 32.2 g/dL      RDW 14.1 %      RDW-SD 47.0 fl      MPV 9.5 fL      Platelets 423 10*3/mm3      Neutrophil % 92.5 %      Lymphocyte % 3.8 %      Monocyte % 3.1 %      Eosinophil % 0.0 %      Basophil % 0.3 %      Immature Grans % 0.3 %      Neutrophils, Absolute 16.55 10*3/mm3      Lymphocytes, Absolute 0.68 10*3/mm3      Monocytes, Absolute 0.55 10*3/mm3      Eosinophils, Absolute 0.00 10*3/mm3      Basophils, Absolute 0.05 10*3/mm3      Immature Grans, Absolute 0.05 10*3/mm3      nRBC 0.0 /100 WBC     TSH Rfx On Abnormal To Free T4 [[id number]]  (Normal) Collected: 07/29/25 1103    Specimen: Blood Updated: 07/29/25 1529     TSH 0.669 uIU/mL     Urinalysis With Microscopic If Indicated (No Culture) - Urine, Clean Catch [[id number]]  (Abnormal) Collected: 07/29/25 1155    Specimen: Urine, Clean Catch Updated: 07/29/25 1213     Color, UA Yellow      Appearance, UA Clear     pH, UA <=5.0     Specific Gravity, UA >1.030     Glucose, UA >=1000 mg/dL (3+)     Ketones, UA 40 mg/dL (2+)     Bilirubin, UA Negative     Blood, UA Negative     Protein, UA Trace     Leuk Esterase, UA Trace     Nitrite, UA Negative     Urobilinogen, UA 0.2 E.U./dL    Urinalysis, Microscopic Only - Urine, Clean Catch [[id number]]  (Abnormal) Collected: 07/29/25 1155    Specimen: Urine, Clean Catch Updated: 07/29/25 1213     RBC, UA 0-2 /HPF      WBC, UA 11-20 /HPF      Bacteria, UA 1+ /HPF      Squamous Epithelial Cells, UA 13-20 /HPF      Hyaline Casts, UA 0-2 /LPF      Methodology Automated Microscopy    STAT Lactic Acid, Reflex [[id number]]  (Abnormal) Collected: 07/29/25 1254    Specimen: Blood Updated: 07/29/25 1335     Lactate 5.1 mmol/L     POC Glucose STAT [[id number]]  (Abnormal) Collected: 07/29/25 1711    Specimen: Blood Updated: 07/29/25 1713     Glucose 152 mg/dL      Comment: Serial Number: [id number]:  [id number]                Imaging:    CT Abdomen Pelvis With Contrast  Result Date: 7/29/2025  CT ABDOMEN PELVIS W CONTRAST Date of Exam: 7/29/2025 12:34 PM EDT Indication: Eval epigastric abdominal pain and distention and vomiting since last night, elevated lactic acid of 5, elevated white blood cell count of 17. Comparison: None available Technique: Axial CT images were obtained of the abdomen and pelvis after the uneventful intravenous administration of iodinated contrast. Reconstructed coronal and sagittal images were also obtained. Automated exposure control and iterative construction methods were used. Findings: The heart size is normal. There is no pericardial effusion. The lung bases are clear. The liver is enlarged measuring 20 cm in craniocaudal dimension. There is diffusely decreased density of the liver parenchyma compatible with diffuse fatty steatosis. The portal veins and hepatic veins are patent. There is a small cyst within the left hepatic lobe of the  liver. The gallbladder is present without wall thickening. There is no intrahepatic or extrahepatic biliary ductal dilatation. The spleen, adrenal glands, and pancreas appear within normal limits. The kidneys are symmetric in size and enhancement. There is no evidence of urolithiasis or hydronephrosis. There is a small cyst within the lateral aspect of the left kidney. The urinary bladder is fluid-filled without wall thickening. There is a small sliding-type hiatal hernia. There is a small bowel containing supraumbilical hernia with associated transition point compatible with small bowel obstruction. There is upstream fecalization and small bowel distention. The terminal ileum and colon is collapsed. The aorta is normal in caliber without evidence of aneurysm formation. There is no abdominal or pelvic lymphadenopathy. There is degenerative disc disease of the lumbar spine.     Impression: 1.Small bowel obstruction with transition point within a supraumbilical hernia. Recommend surgical consultation. 2.Hepatomegaly with diffuse fatty steatosis. Electronically Signed: Luis Denise MD  7/29/2025 1:00 PM EDT  Workstation ID: [id number]        Differential Diagnosis and Discussion:    Abdominal Pain: Based on the patient's signs and symptoms, I considered abdominal aortic aneurysm, small bowel obstruction, pancreatitis, acute cholecystitis, acute appendecitis, peptic ulcer disease, gastritis, colitis, endocrine disorders, irritable bowel syndrome and other differential diagnosis an etiology of the patient's abdominal pain.  Vomiting: Differential diagnosis includes but is not limited to migraine, labyrinthine disorders, psychogenic, metabolic and endocrine causes, peptic ulcer, gastric outlet obstruction, gastritis, gastroenteritis, appendicitis, intestinal obstruction, paralytic ileus, food poisoning, cholecystitis, acute hepatitis, acute pancreatitis, acute febrile illness, and myocardial  infarction.    PROCEDURES:    Labs were collected in the emergency department and all labs were reviewed and interpreted by me.  CT scan was performed in the emergency department and the CT scan radiology impression was interpreted by me.    No orders to display       Procedures    MDM     Amount and/or Complexity of Data Reviewed  Clinical lab tests: reviewed  Tests in the radiology section of CPT®: reviewed           This patient is a 65-year-old female presenting with severe epigastric abdominal pain and distention, severe nausea and vomiting several times last night.    Initial lab work is concerning for elevated white blood cell count of 17 and lactic acid of 5 and we are starting IV fluids and giving her IV pain and nausea medicine for symptom relief.    I am getting a CT scan of the abdomen pelvis with IV contrast to evaluate for possible gastric outlet obstruction versus perforation or other peptic ulcer disease, other bowel obstruction.    CT shows small bowel obstruction secondary to incarcerated umbilical hernia and I am unable to reduce it in the ED so I consulted our general surgeon we will place NG tube and get her admitted and take her to the OR.                  Patient Care Considerations:          Consultants/Shared Management Plan:  Consultant: I spoke with Dr. Velasco of general surgery regarding her hernia and bowel obstruction  Hospitalist: I have discussed the case with the admitting hospitalist who agrees to accept the patient for admission.    Social Determinants of Health:    Patient is independent, reliable, and has access to care.       Disposition and Care Coordination:    Admit:   Through independent evaluation of the patient's history, physical, and imperical data, the patient meets criteria for inpatient admission to the hospital.        Final diagnoses:   Small bowel obstruction   Incarcerated umbilical hernia   Lactic acidosis        ED Disposition       ED Disposition   Send to OR /  Endo    Condition   --    Comment   Level of Care: Telemetry [5]  Diagnosis: SBO (small bowel obstruction) [455265]  Admitting Physician: ARNOLD ALY [[id number]]  Attending Physician: ARNOLD ALY [[id number]]  Bed Request Comments: 5th floor if possible please  Certification: I Certify  That Inpatient Hospital Services Are Medically Necessary For Greater Than 2 Midnights                 This medical record created using voice recognition software.             Stefan Monroe MD  07/29/25 3283

## 2025-07-29 NOTE — ANESTHESIA POSTPROCEDURE EVALUATION
Patient: Yumiko Chatman    Procedure Summary       Date: 07/29/25 Room / Location: [facility] OR 03 / [facility] MAIN OR    Anesthesia Start: 1544 Anesthesia Stop: 1708    Procedure: UMBILICAL HERNIA REPAIR (Abdomen) Diagnosis:       Incarcerated umbilical hernia      (Incarcerated umbilical hernia [K42.0])    Surgeons: Quintin Velasco MD Provider: Filipe Zavala MD    Anesthesia Type: general ASA Status: 3 - Emergent            Anesthesia Type: general    Vitals  Vitals Value Taken Time   /74 07/29/25 18:00   Temp 37 °C (98.6 °F) 07/29/25 17:06   Pulse 105 07/29/25 18:03   Resp 16 07/29/25 17:45   SpO2 93 % 07/29/25 18:03   Vitals shown include unfiled device data.        Post Anesthesia Care and Evaluation    Patient location during evaluation: bedside  Patient participation: complete - patient participated  Level of consciousness: awake  Pain score: 2  Pain management: adequate    Airway patency: patent  Anesthetic complications: No anesthetic complications  PONV Status: none  Cardiovascular status: acceptable and stable  Respiratory status: acceptable  Hydration status: acceptable

## 2025-07-29 NOTE — H&P (VIEW-ONLY)
[facility]   Consult    Patient Name: Yumiko Chatman  : 1960  MRN: [id number]  Primary Care Physician:  Provider, No Known  Date of admission: 2025    Subjective   Subjective     Chief Complaint: Abdominal pain, nausea, vomiting    Consult Reason: Incarcerated umbilical hernia    HPI: Yumiko Chatman is a 65 y.o. female who presented to hospital after acute onset abdominal pain starting last evening.  She localizes her pain to the epigastric region and has had some associated emesis that has changed in color since onset.  She notes she last passed flatus yesterday and thinks she had a small smear of a bowel movement earlier today.  She is never had pain like this in the past.  Her previous abdominal surgical history includes an open primary repair of an umbilical hernia approximately 20 years ago.    Review of Systems   Constitutional:  Negative for chills and fever.   HENT:  Negative for sore throat.    Respiratory:  Negative for shortness of breath.    Cardiovascular:  Negative for chest pain.   Gastrointestinal:  Positive for abdominal distention, abdominal pain, nausea and vomiting.   Genitourinary:  Negative for difficulty urinating.   Neurological:  Negative for dizziness.   Psychiatric/Behavioral:  Negative for confusion.        Personal History     Past Medical History:   Diagnosis Date    Hiatal hernia     Hyperglycemia due to diabetes mellitus     Hypothyroidism     Large liver 2025    Ulcerative colitis 2025       Past Surgical History:   Procedure Laterality Date    UMBILICAL HERNIA REPAIR         Family History: family history is not on file. Otherwise pertinent FHx was reviewed and not pertinent to current issue.    Social History:      Home Medications:       Allergies:  Allergies   Allergen Reactions    Sulfa Antibiotics Nausea And Vomiting       Objective    Objective     Vitals:   Temp:  [97.6 °F (36.4 °C)] 97.6 °F (36.4 °C)  Heart Rate:  [105-119] 119  Resp:  [16]  16  BP: (134-156)/(75-85) 137/77    Physical Exam  Constitutional:       Appearance: Normal appearance.   HENT:      Head: Normocephalic and atraumatic.      Mouth/Throat:      Mouth: Mucous membranes are moist.      Pharynx: Oropharynx is clear.   Cardiovascular:      Rate and Rhythm: Normal rate and regular rhythm.   Pulmonary:      Effort: Pulmonary effort is normal. No respiratory distress.   Abdominal:      Comments: Soft, obese body habitus, supraumbilical tenderness with suspected incarcerated hernia   Musculoskeletal:         General: No swelling. Normal range of motion.      Cervical back: Normal range of motion and neck supple.   Skin:     General: Skin is warm and dry.   Neurological:      General: No focal deficit present.      Mental Status: She is alert and oriented to person, place, and time.   Psychiatric:         Mood and Affect: Mood normal.         Behavior: Behavior normal.         Result Review    Result Review:  I have personally reviewed the results from the time of this admission to 7/29/2025 14:36 EDT and agree with these findings:  [x]  Laboratory  []  Microbiology  [x]  Radiology  []  EKG/Telemetry   []  Cardiology/Vascular   []  Pathology  []  Old records  []  Other:  Most notable findings include: Incarcerated incisional hernia at the umbilicus    Assessment & Plan   Assessment / Plan     Brief Patient Summary:  Yumiko Chatman is a 65 y.o. female who presented with obstructive symptoms, found to have incarcerated supraumbilical hernia    Active Hospital Problems:  Active Hospital Problems    Diagnosis     **Incarcerated umbilical hernia        Plan:   Incisional hernia, incarcerated  - Afebrile, tachycardic, leukocytosis 17,000, lactic acidosis 5.1  - CT abdomen pelvis reviewed and consistent with incarcerated small bowel within a supraumbilical defect  -Admit to hospitalist service  - recommend operative management with hernia repair possible bowel resection and any other indicated  procedure  - Risks/benefits/alternatives of the procedure were explained to the patient and she was agreeable to proceeding    Electronically signed by Quintin Velasco MD, 07/29/25, 2:36 PM EDT.

## 2025-07-30 PROBLEM — Z98.890 S/P HERNIA REPAIR: Status: ACTIVE | Noted: 2025-07-29

## 2025-07-30 PROBLEM — Z87.19 S/P HERNIA REPAIR: Status: ACTIVE | Noted: 2025-07-29

## 2025-07-30 LAB
ALBUMIN SERPL-MCNC: 4 G/DL (ref 3.5–5.2)
ALBUMIN/GLOB SERPL: 1.5 G/DL
ALP SERPL-CCNC: 105 U/L (ref 39–117)
ALT SERPL W P-5'-P-CCNC: 33 U/L (ref 1–33)
ANION GAP SERPL CALCULATED.3IONS-SCNC: 9.4 MMOL/L (ref 5–15)
AST SERPL-CCNC: 22 U/L (ref 1–32)
BASOPHILS # BLD AUTO: 0.03 10*3/MM3 (ref 0–0.2)
BASOPHILS NFR BLD AUTO: 0.3 % (ref 0–1.5)
BILIRUB SERPL-MCNC: 0.4 MG/DL (ref 0–1.2)
BUN SERPL-MCNC: 14.7 MG/DL (ref 8–23)
BUN/CREAT SERPL: 21 (ref 7–25)
CALCIUM SPEC-SCNC: 8.7 MG/DL (ref 8.6–10.5)
CHLORIDE SERPL-SCNC: 105 MMOL/L (ref 98–107)
CO2 SERPL-SCNC: 25.6 MMOL/L (ref 22–29)
CREAT SERPL-MCNC: 0.7 MG/DL (ref 0.57–1)
D-LACTATE SERPL-SCNC: 4.1 MMOL/L (ref 0.5–2)
DEPRECATED RDW RBC AUTO: 49.1 FL (ref 37–54)
EGFRCR SERPLBLD CKD-EPI 2021: 96.1 ML/MIN/1.73
EOSINOPHIL # BLD AUTO: 0 10*3/MM3 (ref 0–0.4)
EOSINOPHIL NFR BLD AUTO: 0 % (ref 0.3–6.2)
ERYTHROCYTE [DISTWIDTH] IN BLOOD BY AUTOMATED COUNT: 14.6 % (ref 12.3–15.4)
GLOBULIN UR ELPH-MCNC: 2.6 GM/DL
GLUCOSE BLDC GLUCOMTR-MCNC: 97 MG/DL (ref 70–99)
GLUCOSE SERPL-MCNC: 152 MG/DL (ref 65–99)
HBA1C MFR BLD: 6.1 % (ref 4.8–5.6)
HCT VFR BLD AUTO: 36 % (ref 34–46.6)
HGB BLD-MCNC: 11.2 G/DL (ref 12–15.9)
IMM GRANULOCYTES # BLD AUTO: 0.02 10*3/MM3 (ref 0–0.05)
IMM GRANULOCYTES NFR BLD AUTO: 0.2 % (ref 0–0.5)
INR PPP: 1.03 (ref 0.86–1.15)
LYMPHOCYTES # BLD AUTO: 0.74 10*3/MM3 (ref 0.7–3.1)
LYMPHOCYTES NFR BLD AUTO: 6.6 % (ref 19.6–45.3)
MAGNESIUM SERPL-MCNC: 2.1 MG/DL (ref 1.6–2.4)
MCH RBC QN AUTO: 28.6 PG (ref 26.6–33)
MCHC RBC AUTO-ENTMCNC: 31.1 G/DL (ref 31.5–35.7)
MCV RBC AUTO: 92.1 FL (ref 79–97)
MONOCYTES # BLD AUTO: 0.65 10*3/MM3 (ref 0.1–0.9)
MONOCYTES NFR BLD AUTO: 5.8 % (ref 5–12)
NEUTROPHILS NFR BLD AUTO: 87.1 % (ref 42.7–76)
NEUTROPHILS NFR BLD AUTO: 9.76 10*3/MM3 (ref 1.7–7)
NRBC BLD AUTO-RTO: 0 /100 WBC (ref 0–0.2)
PHOSPHATE SERPL-MCNC: 2.2 MG/DL (ref 2.5–4.5)
PLATELET # BLD AUTO: 364 10*3/MM3 (ref 140–450)
PMV BLD AUTO: 9.8 FL (ref 6–12)
POTASSIUM SERPL-SCNC: 4.7 MMOL/L (ref 3.5–5.2)
PROT SERPL-MCNC: 6.6 G/DL (ref 6–8.5)
PROTHROMBIN TIME: 14 SECONDS (ref 11.8–14.9)
RBC # BLD AUTO: 3.91 10*6/MM3 (ref 3.77–5.28)
SODIUM SERPL-SCNC: 140 MMOL/L (ref 136–145)
WBC NRBC COR # BLD AUTO: 11.2 10*3/MM3 (ref 3.4–10.8)

## 2025-07-30 PROCEDURE — 25810000003 SODIUM CHLORIDE 0.9 % SOLUTION: Performed by: INTERNAL MEDICINE

## 2025-07-30 PROCEDURE — 80053 COMPREHEN METABOLIC PANEL: CPT | Performed by: STUDENT IN AN ORGANIZED HEALTH CARE EDUCATION/TRAINING PROGRAM

## 2025-07-30 PROCEDURE — 82948 REAGENT STRIP/BLOOD GLUCOSE: CPT

## 2025-07-30 PROCEDURE — 83036 HEMOGLOBIN GLYCOSYLATED A1C: CPT | Performed by: INTERNAL MEDICINE

## 2025-07-30 PROCEDURE — 84100 ASSAY OF PHOSPHORUS: CPT | Performed by: STUDENT IN AN ORGANIZED HEALTH CARE EDUCATION/TRAINING PROGRAM

## 2025-07-30 PROCEDURE — 99231 SBSQ HOSP IP/OBS SF/LOW 25: CPT | Performed by: NURSE PRACTITIONER

## 2025-07-30 PROCEDURE — 25010000002 HYDROMORPHONE 1 MG/ML SOLUTION: Performed by: STUDENT IN AN ORGANIZED HEALTH CARE EDUCATION/TRAINING PROGRAM

## 2025-07-30 PROCEDURE — 83735 ASSAY OF MAGNESIUM: CPT | Performed by: STUDENT IN AN ORGANIZED HEALTH CARE EDUCATION/TRAINING PROGRAM

## 2025-07-30 PROCEDURE — 97161 PT EVAL LOW COMPLEX 20 MIN: CPT

## 2025-07-30 PROCEDURE — 85610 PROTHROMBIN TIME: CPT | Performed by: STUDENT IN AN ORGANIZED HEALTH CARE EDUCATION/TRAINING PROGRAM

## 2025-07-30 PROCEDURE — 94761 N-INVAS EAR/PLS OXIMETRY MLT: CPT

## 2025-07-30 PROCEDURE — 85025 COMPLETE CBC W/AUTO DIFF WBC: CPT | Performed by: STUDENT IN AN ORGANIZED HEALTH CARE EDUCATION/TRAINING PROGRAM

## 2025-07-30 PROCEDURE — 25810000003 LACTATED RINGERS SOLUTION: Performed by: FAMILY MEDICINE

## 2025-07-30 PROCEDURE — 99232 SBSQ HOSP IP/OBS MODERATE 35: CPT | Performed by: INTERNAL MEDICINE

## 2025-07-30 PROCEDURE — 94799 UNLISTED PULMONARY SVC/PX: CPT

## 2025-07-30 PROCEDURE — 97165 OT EVAL LOW COMPLEX 30 MIN: CPT

## 2025-07-30 PROCEDURE — 25810000003 LACTATED RINGERS PER 1000 ML: Performed by: INTERNAL MEDICINE

## 2025-07-30 RX ORDER — ACETAMINOPHEN 325 MG/1
650 TABLET ORAL EVERY 6 HOURS PRN
Status: DISCONTINUED | OUTPATIENT
Start: 2025-07-30 | End: 2025-07-30

## 2025-07-30 RX ORDER — ASPIRIN 81 MG/1
81 TABLET ORAL AS NEEDED
Status: DISCONTINUED | OUTPATIENT
Start: 2025-07-30 | End: 2025-07-30

## 2025-07-30 RX ORDER — HYDROXYZINE HYDROCHLORIDE 25 MG/1
25 TABLET, FILM COATED ORAL 3 TIMES DAILY PRN
Status: DISCONTINUED | OUTPATIENT
Start: 2025-07-30 | End: 2025-08-01 | Stop reason: HOSPADM

## 2025-07-30 RX ORDER — HYDROCODONE BITARTRATE AND ACETAMINOPHEN 5; 325 MG/1; MG/1
1 TABLET ORAL EVERY 6 HOURS PRN
Refills: 0 | Status: DISCONTINUED | OUTPATIENT
Start: 2025-07-30 | End: 2025-08-01

## 2025-07-30 RX ORDER — ACETAMINOPHEN 325 MG/1
650 TABLET ORAL EVERY 6 HOURS PRN
Status: DISCONTINUED | OUTPATIENT
Start: 2025-07-30 | End: 2025-08-01 | Stop reason: HOSPADM

## 2025-07-30 RX ORDER — FENTANYL/ROPIVACAINE/NS/PF 2-625MCG/1
15 PLASTIC BAG, INJECTION (ML) EPIDURAL ONCE
Status: COMPLETED | OUTPATIENT
Start: 2025-07-30 | End: 2025-07-30

## 2025-07-30 RX ORDER — PANTOPRAZOLE SODIUM 40 MG/10ML
40 INJECTION, POWDER, LYOPHILIZED, FOR SOLUTION INTRAVENOUS
Status: DISCONTINUED | OUTPATIENT
Start: 2025-07-30 | End: 2025-08-01

## 2025-07-30 RX ORDER — ALUMINA, MAGNESIA, AND SIMETHICONE 2400; 2400; 240 MG/30ML; MG/30ML; MG/30ML
15 SUSPENSION ORAL EVERY 6 HOURS PRN
Status: DISCONTINUED | OUTPATIENT
Start: 2025-07-30 | End: 2025-07-30

## 2025-07-30 RX ORDER — ALUMINA, MAGNESIA, AND SIMETHICONE 2400; 2400; 240 MG/30ML; MG/30ML; MG/30ML
15 SUSPENSION ORAL EVERY 6 HOURS PRN
Status: DISCONTINUED | OUTPATIENT
Start: 2025-07-30 | End: 2025-08-01 | Stop reason: HOSPADM

## 2025-07-30 RX ORDER — HYDROXYZINE HYDROCHLORIDE 25 MG/1
25 TABLET, FILM COATED ORAL 3 TIMES DAILY PRN
Status: DISCONTINUED | OUTPATIENT
Start: 2025-07-30 | End: 2025-07-30

## 2025-07-30 RX ORDER — LEVOTHYROXINE SODIUM 50 UG/1
100 TABLET ORAL DAILY
Status: DISCONTINUED | OUTPATIENT
Start: 2025-07-30 | End: 2025-08-01

## 2025-07-30 RX ADMIN — SODIUM CHLORIDE, POTASSIUM CHLORIDE, SODIUM LACTATE AND CALCIUM CHLORIDE 70 ML/HR: 600; 310; 30; 20 INJECTION, SOLUTION INTRAVENOUS at 15:31

## 2025-07-30 RX ADMIN — HYDROMORPHONE HYDROCHLORIDE 0.25 MG: 1 INJECTION, SOLUTION INTRAMUSCULAR; INTRAVENOUS; SUBCUTANEOUS at 00:17

## 2025-07-30 RX ADMIN — Medication 10 ML: at 07:16

## 2025-07-30 RX ADMIN — SODIUM CHLORIDE, POTASSIUM CHLORIDE, SODIUM LACTATE AND CALCIUM CHLORIDE 1000 ML: 600; 310; 30; 20 INJECTION, SOLUTION INTRAVENOUS at 00:50

## 2025-07-30 RX ADMIN — PANTOPRAZOLE SODIUM 40 MG: 40 INJECTION, POWDER, FOR SOLUTION INTRAVENOUS at 14:04

## 2025-07-30 RX ADMIN — HYDROMORPHONE HYDROCHLORIDE 0.5 MG: 1 INJECTION, SOLUTION INTRAMUSCULAR; INTRAVENOUS; SUBCUTANEOUS at 18:23

## 2025-07-30 RX ADMIN — POTASSIUM PHOSPHATE 15 MMOL: 236; 224 INJECTION, SOLUTION INTRAVENOUS at 08:56

## 2025-07-30 NOTE — PROGRESS NOTES
" [facility]   Hospitalist Progress Note    Date of admission: 7/29/2025  Patient Name: Yumiko Chatman  1960  Date: 7/30/2025      Subjective     Chief Complaint   Patient presents with    Abdominal Pain     Abdominal pain with nausea and vomiting x 1 day       Interval Followup: Patient having some flatus today that she describes as \"lower burps \"but no bowel movement yet.  Still with NG out but no overt nausea apart from some sinus drainage from the NG but otherwise not having significant discomfort.  Last pain medication was last night  Is Thursday to drink something but is still n.p.o. apart from ice chips  Does note some reflux symptoms    Objective     Vitals:   Temp:  [97.3 °F (36.3 °C)-99.3 °F (37.4 °C)] 99.3 °F (37.4 °C)  Heart Rate:  [] 100  Resp:  [16] 16  BP: (101-134)/(66-88) 122/66  Flow (L/min) (Oxygen Therapy):  [0-2] 0    Physical Exam  Awake, conversant  CTAB no wheezing room air  Elevated rate regular rhythm  Abdomen nondistended    Result Review:  Vital signs, labs and recent relevant imaging reviewed.      CBC          7/29/2025    11:03 7/30/2025    03:25   CBC   WBC 17.88  11.20    RBC 4.99  3.91    Hemoglobin 14.7  11.2    Hematocrit 45.6  36.0    MCV 91.4  92.1    MCH 29.5  28.6    MCHC 32.2  31.1    RDW 14.1  14.6    Platelets 423  364      CMP          7/29/2025    11:03 7/30/2025    03:25   CMP   Glucose 166  152    BUN 17.2  14.7    Creatinine 0.90  0.70    EGFR 71.1  96.1    Sodium 141  140    Potassium 5.0  4.7    Chloride 100  105    Calcium 10.5  8.7    Total Protein 8.5  6.6    Albumin 5.0  4.0    Globulin 3.5  2.6    Total Bilirubin 0.3  0.4    Alkaline Phosphatase 151  105    AST (SGOT) 39  22    ALT (SGPT) 50  33    Albumin/Globulin Ratio 1.4  1.5    BUN/Creatinine Ratio 19.1  21.0    Anion Gap 18.4  9.4          acetaminophen    acetaminophen    aluminum-magnesium hydroxide-simethicone    artificial tears    benzocaine-menthol    Diclofenac Sodium    " "HYDROcodone-acetaminophen    HYDROmorphone    hydrOXYzine    Lidocaine (Anorectal)    Lidocaine    melatonin    mupirocin    nicotine    ondansetron    sodium chloride    [COMPLETED] Insert Peripheral IV **AND** sodium chloride    sodium chloride    sodium chloride    [Held by provider] levothyroxine, 100 mcg, Oral, Daily  mupirocin, , ,   pantoprazole, 40 mg, Intravenous, Q AM  Scopolamine, 1 patch, Transdermal, Once  sodium chloride, 10 mL, Intravenous, Q12H        CT Abdomen Pelvis With Contrast  Result Date: 7/29/2025  Impression: 1.Small bowel obstruction with transition point within a supraumbilical hernia. Recommend surgical consultation. 2.Hepatomegaly with diffuse fatty steatosis. Electronically Signed: Luis Denise MD  7/29/2025 1:00 PM EDT  Workstation ID: [id number]      Assessment / Plan     Summary: 65 y.o. with history of umbilical hernia repair otherwise no significant abdominal surgeries, has prediabetes and hypothyroidism, had acute onset of abdominal pain found to have incarcerated umbilical hernia.  Patient went to the OR on 7/29 for open incisional hernia repair with biologic mesh placement.    Assessment/Plan (clinically significant if listed here)  SBO with transition point with incarcerated umbilical hernia in setting of remote history of umbilical hernia repair  Hx of umbilical hernia repair  Lactic acidosis  Leukocytosis suspect reactive in setting of SBO  Morbid obesity BMI of 36  Mildly elevated transaminases suspect in setting of hepatomegaly and diffuse fatty steatosis per CT with history of \"enlarged liver and abnormal liver enzymes\"  Hypothyroidism    Continue perioperative monitoring and monitoring for resumption of bowel function wound care for postoperative dressing as per surgery appreciate assistance, diet as per their evaluation ice chips today  Starting PPI for GERD or reflux seems okay Chronic component of this given apart from current issues  Replace phosphorus IV, continue " with IV fluids, monitor electrolytes  Patient is prediabetic A1c 6.1, no need for insulin is only on Farxiga outpatient  IV pain medication, monitor sedation is opiate naïve otherwise fortunately not having to significant of symptoms, iv offirmev prn to try and limit total dosing  TSH normal limits, on Synthroid at hold until able to resume.  No need for IV conversion at this time  As needed antiemetics  LFTs are actually better today, needs weight loss and monitoring outpatient  Leukocytosis improving supportive care suspect reactive.  Did have standard prophylactic perioperative antibiotics but otherwise no additional needs at this time  PT/OT  Check a.m. CBC, BMP, magnesium, phosphorus    Dispo: likely home once medically stable.     VTE Prophylaxis:  Mechanical VTE prophylaxis orders are present.      Code Status (Patient has no pulse and is not breathing): CPR (Attempt to Resuscitate)  Medical Interventions (Patient has pulse or is breathing): Full Support

## 2025-07-30 NOTE — THERAPY EVALUATION
Patient Name: Yumiko Chatman  : 1960    MRN: [id number]                              Today's Date: 2025       Admit Date: 2025    Visit Dx:     ICD-10-CM ICD-9-CM   1. Small bowel obstruction  K56.609 560.9   2. Incarcerated umbilical hernia  K42.0 552.1   3. Lactic acidosis  E87.20 276.2   4. Decreased activities of daily living (ADL)  Z78.9 V49.89   5. Difficulty walking  R26.2 719.7     Patient Active Problem List   Diagnosis    Incarcerated umbilical hernia    Abnormal results of liver function studies    Anemia due to chronic blood loss    Arthritis    Iron deficiency anemia    Joint pain    Knee pain    Primary malignant neoplasm of skin    Sarcoidosis of lung    Tear of medial meniscus of left knee, initial encounter    Ulcerative colitis    Large liver    S/P Open incisional hernia repair (5 cm) with underlay biologic mesh placement     Past Medical History:   Diagnosis Date    Abnormal results of liver function studies 2025    Anemia due to chronic blood loss 2025    Arthritis 2025    BMI 36.0-36.9,adult     Hiatal hernia     Hyperglycemia due to diabetes mellitus     Hypothyroidism     Iron deficiency anemia 2025    Joint pain 2025    Knee pain 2025    Large liver 2025    Primary malignant neoplasm of skin 2025    S/P Open incisional hernia repair (5 cm) with underlay biologic mesh placement 2025    Sarcoidosis of lung 2025    Tear of medial meniscus of left knee, initial encounter 2015    Ulcerative colitis 2025     Past Surgical History:   Procedure Laterality Date    UMBILICAL HERNIA REPAIR      UMBILICAL HERNIA REPAIR N/A 2025    Procedure: UMBILICAL HERNIA REPAIR;  Surgeon: Quintin Velasco MD;  Location: [facility] MAIN OR;  Service: General;  Laterality: N/A;      General Information       Row Name 25 1342          OT Time and Intention    Document Type evaluation  -LF     Mode of Treatment individual  therapy;occupational therapy  -       Row Name 07/30/25 1342          General Information    Patient Profile Reviewed yes  -     Prior Level of Function --  (I) with ADLs, ambulated w/o a device, has a walk-in shower with shower chair/handheld shower head, elevated commode, stands to groom, and no home O2.  -     Existing Precautions/Restrictions other (see comments)  abdominal pain  -     Barriers to Rehab none identified  -[facility] Name 07/30/25 1342          Occupational Profile    Reason for Services/Referral (Occupational Profile) Patient is a 65 year old female who is currently status post open incisional hernia repair with underlay biologic mesh placement on July 29th, 2025. Occupational therapy consulted due to recent decline in ADLs/functional transfers. No previous occupational therapy services for current condition.  -[facility] Name 07/30/25 1342          Living Environment    Current Living Arrangements home  -     People in Home spouse  -[facility] Name 07/30/25 1342          Home Main Entrance    Number of Stairs, Main Entrance three  -     Stair Railings, Main Entrance railings safe and in good condition  -[facility] Name 07/30/25 1342          Cognition    Orientation Status (Cognition) oriented x 4  -[facility] Name 07/30/25 1342          Safety Issues/Impairments Affecting Functional Mobility    Impairments Affecting Function (Mobility) balance;pain;endurance/activity tolerance  -               User Key  (r) = Recorded By, (t) = Taken By, (c) = Cosigned By      Initials Name Provider Type     Amalia Grimes OT Occupational Therapist                     Mobility/ADL's       [facility] Name 07/30/25 1412          Bed Mobility    Comment, (Bed Mobility) Pt seated in recliner on arrival.  -[facility] Name 07/30/25 1412          Sit-Stand Transfer    Sit-Stand [place] (Transfers) supervision  -[facility] Name 07/30/25 1412          Stand-Sit Transfer    Stand-Sit  [place] (Transfers) supervision  -LF       Row Name 07/30/25 1412          Activities of Daily Living    BADL Assessment/Intervention bathing;upper body dressing;lower body dressing;grooming;feeding;toileting  -LF       Row Name 07/30/25 1412          Bathing Assessment/Intervention    [place] Level (Bathing) bathing skills;upper body;set up;lower body;moderate assist (50% patient effort)  -LF       Row Name 07/30/25 1412          Upper Body Dressing Assessment/Training    [place] Level (Upper Body Dressing) upper body dressing skills;set up  -LF       Row Name 07/30/25 1412          Lower Body Dressing Assessment/Training    [place] Level (Lower Body Dressing) lower body dressing skills;moderate assist (50% patient effort)  -LF       Row Name 07/30/25 1412          Grooming Assessment/Training    [place] Level (Grooming) grooming skills;set up  -LF       Row Name 07/30/25 1412          Self-Feeding Assessment/Training    [place] Level (Feeding) feeding skills;set up  -LF       Row Name 07/30/25 1412          Toileting Assessment/Training    [place] Level (Toileting) toileting skills;minimum assist (75% patient effort)  -               User Key  (r) = Recorded By, (t) = Taken By, (c) = Cosigned By      Initials Name Provider Type     Amalia Grimes OT Occupational Therapist                   Obj/Interventions       Row Name 07/30/25 1413          Sensory Assessment (Somatosensory)    Sensory Assessment (Somatosensory) UE sensation intact  -LF       Row Name 07/30/25 1413          Vision Assessment/Intervention    Visual Impairment/Limitations WFL  -LF       Row Name 07/30/25 1413          Range of Motion Comprehensive    General Range of Motion bilateral upper extremity ROM WFL  -LF       Row Name 07/30/25 1413          Strength Comprehensive (MMT)    Comment, General Manual Muscle Testing (MMT) Assessment 4+/5 BUEs  -LF       Row Name 07/30/25 1413          Motor Skills     Motor Skills coordination;functional endurance  -LF     Coordination bilateral;upper extremity;WFL  -LF     Functional Endurance Fair/fair+  -LF       Row Name 07/30/25 1413          Balance    Balance Assessment sitting dynamic balance;standing dynamic balance  -LF     Dynamic Sitting Balance supervision  -LF     Position, Sitting Balance unsupported;sitting in chair  -LF     Dynamic Standing Balance supervision  -LF     Position/Device Used, Standing Balance unsupported  -LF               User Key  (r) = Recorded By, (t) = Taken By, (c) = Cosigned By      Initials Name Provider Type     Amalia Grimes OT Occupational Therapist                   Goals/Plan       Row Name 07/30/25 1415          Bed Mobility Goal 1 (OT)    Activity/Assistive Device (Bed Mobility Goal 1, OT) bed mobility activities, all  -LF     Alpha Level/Cues Needed (Bed Mobility Goal 1, OT) independent  -LF     Time Frame (Bed Mobility Goal 1, OT) long term goal (LTG);10 days  -       Row Name 07/30/25 1415          Transfer Goal 1 (OT)    Activity/Assistive Device (Transfer Goal 1, OT) transfers, all  -LF     Alpha Level/Cues Needed (Transfer Goal 1, OT) independent  -LF     Time Frame (Transfer Goal 1, OT) long term goal (LTG);10 days  -       Row Name 07/30/25 1415          Bathing Goal 1 (OT)    Activity/Device (Bathing Goal 1, OT) bathing skills, all  -LF     Alpha Level/Cues Needed (Bathing Goal 1, OT) independent  -LF     Time Frame (Bathing Goal 1, OT) long term goal (LTG);10 days  -       Row Name 07/30/25 1415          Dressing Goal 1 (OT)    Activity/Device (Dressing Goal 1, OT) dressing skills, all  -LF     Alpha/Cues Needed (Dressing Goal 1, OT) independent  -LF     Time Frame (Dressing Goal 1, OT) long term goal (LTG);10 days  -       Row Name 07/30/25 1415          Toileting Goal 1 (OT)    Activity/Device (Toileting Goal 1, OT) toileting skills, all  -LF     Alpha Level/Cues Needed  (Toileting Goal 1, OT) independent  -     Time Frame (Toileting Goal 1, OT) long term goal (LTG);10 days  -       Row Name 07/30/25 1415          Problem Specific Goal 1 (OT)    Problem Specific Goal 1 (OT) Patient will demonstrate good endurance to support ADLs/functional transfers.  -     Time Frame (Problem Specific Goal 1, OT) long term goal (LTG);10 days  -       Row Name 07/30/25 1415          Therapy Assessment/Plan (OT)    Planned Therapy Interventions (OT) activity tolerance training;BADL retraining;functional balance retraining;occupation/activity based interventions;patient/caregiver education/training;strengthening exercise;transfer/mobility retraining  -               User Key  (r) = Recorded By, (t) = Taken By, (c) = Cosigned By      Initials Name Provider Type     Amalia Grimes, OT Occupational Therapist                   Clinical Impression       Row Name 07/30/25 1414          Pain Assessment    Additional Documentation Pain Scale: FACES Pre/Post-Treatment (Group)  -       Row Name 07/30/25 1414          Pain Scale: FACES Pre/Post-Treatment    Pain: FACES Scale, Pretreatment 2-->hurts little bit  -     Posttreatment Pain Rating 2-->hurts little bit  -       Row Name 07/30/25 1414          Plan of Care Review    Plan of Care Reviewed With patient  -     Progress no change  -     Outcome Evaluation Patient presents with limitations in self-care, functional transfers, balance, and endurance. She would benefit from continued skilled occupational therapy services to maximize independence with ADLs/functional transfers.  -       Row Name 07/30/25 1414          Therapy Assessment/Plan (OT)    Patient/Family Therapy Goal Statement (OT) To maximize independence.  -LF     Rehab Potential (OT) good  -LF     Criteria for Skilled Therapeutic Interventions Met (OT) yes;meets criteria;skilled treatment is necessary  -     Therapy Frequency (OT) 5 times/wk  -       Row Name 07/30/25  1414          Therapy Plan Review/Discharge Plan (OT)    Anticipated Discharge Disposition (OT) home with assist  -LF       Row Name 07/30/25 1414          Vital Signs    O2 Delivery Pre Treatment room air  -LF     O2 Delivery Intra Treatment room air  -LF     O2 Delivery Post Treatment room air  -LF       Row Name 07/30/25 1414          Positioning and Restraints    Pre-Treatment Position sitting in chair/recliner  -LF     Post Treatment Position chair  -LF     In Chair sitting;call light within reach;encouraged to call for assist;with family/caregiver  no alarm active on arrival  -               User Key  (r) = Recorded By, (t) = Taken By, (c) = Cosigned By      Initials Name Provider Type    LF Amalia Grimes, CRISTINA Occupational Therapist                   Outcome Measures       Row Name 07/30/25 1415          How much help from another is currently needed...    Putting on and taking off regular lower body clothing? 2  -LF     Bathing (including washing, rinsing, and drying) 2  -LF     Toileting (which includes using toilet bed pan or urinal) 3  -LF     Putting on and taking off regular upper body clothing 4  -LF     Taking care of personal grooming (such as brushing teeth) 4  -LF     Eating meals 4  -LF     AM-PAC 6 Clicks Score (OT) 19  -LF       Row Name 07/30/25 1300 07/30/25 1146       How much help from another person do you currently need...    Turning from your back to your side while in flat bed without using bedrails? 4  -AV --  -AR    Moving from lying on back to sitting on the side of a flat bed without bedrails? 3  -AV --  -AR    Moving to and from a bed to a chair (including a wheelchair)? 4  -AV --  -AR    Standing up from a chair using your arms (e.g., wheelchair, bedside chair)? 4  -AV --  -AR    Climbing 3-5 steps with a railing? 4  -AV --  -AR    To walk in hospital room? 4  -AV --  -AR    AM-PAC 6 Clicks Score (PT) 23  -AV --  -AR    Highest Level of Mobility Goal Walk 25 Feet or More-7  -AV  --  -AR      Row Name 07/30/25 0716          How much help from another person do you currently need...    Turning from your back to your side while in flat bed without using bedrails? 3  -MB     Moving from lying on back to sitting on the side of a flat bed without bedrails? 3  -MB     Moving to and from a bed to a chair (including a wheelchair)? 3  -MB     Standing up from a chair using your arms (e.g., wheelchair, bedside chair)? 3  -MB     Climbing 3-5 steps with a railing? 2  -MB     To walk in hospital room? 3  -MB     AM-PAC 6 Clicks Score (PT) 17  -MB     Highest Level of Mobility Goal Stand (1 or More Minutes)-5  -MB       Row Name 07/30/25 1415 07/30/25 1300       Functional Assessment    Outcome Measure Options AM-PAC 6 Clicks Daily Activity (OT);Optimal Instrument  -LF AM-PAC 6 Clicks Basic Mobility (PT)  -AV      Row Name 07/30/25 1415          Optimal Instrument    Optimal Instrument Optimal - 3  -LF     Bending/Stooping 3  -LF     Standing 1  -LF     Reaching 1  -LF     From the list, choose the 3 activities you would most like to be able to do without any difficulty Bending/stooping;Standing;Reaching  -LF     Total Score Optimal - 3 5  -LF               User Key  (r) = Recorded By, (t) = Taken By, (c) = Cosigned By      Initials Name Provider Type    Meg Borges, RN Registered Nurse    Mee Sterling RN Registered Nurse    Amalia Lopez OT Occupational Therapist    Adam White, PT Physical Therapist                    Occupational Therapy Education       Title: PT OT SLP Therapies (In Progress)       Topic: Occupational Therapy (In Progress)       Point: ADL training (In Progress)       Learning Progress Summary            Patient Acceptance, E,TB, NR by LF at 7/30/2025 1415                      Point: Precautions (In Progress)       Learning Progress Summary            Patient Acceptance, E,TB, NR by LF at 7/30/2025 1415                      Point: Body mechanics (In  Progress)       Learning Progress Summary            Patient Acceptance, E,TB, NR by  at 7/30/2025 1415                                      User Key       Initials Effective Dates Name Provider Type Discipline     06/16/21 -  Amalia Grimes OT Occupational Therapist OT                  OT Recommendation and Plan  Planned Therapy Interventions (OT): activity tolerance training, BADL retraining, functional balance retraining, occupation/activity based interventions, patient/caregiver education/training, strengthening exercise, transfer/mobility retraining  Therapy Frequency (OT): 5 times/wk  Plan of Care Review  Plan of Care Reviewed With: patient  Progress: no change  Outcome Evaluation: Patient presents with limitations in self-care, functional transfers, balance, and endurance. She would benefit from continued skilled occupational therapy services to maximize independence with ADLs/functional transfers.     Time Calculation:   Evaluation Complexity (OT)  Review Occupational Profile/Medical/Therapy History Complexity: brief/low complexity  Assessment, Occupational Performance/Identification of Deficit Complexity: 1-3 performance deficits  Clinical Decision Making Complexity (OT): problem focused assessment/low complexity  Overall Complexity of Evaluation (OT): low complexity     Time Calculation- OT       Row Name 07/30/25 1418             Time Calculation- OT    OT Received On 07/30/25  -      OT Goal Re-Cert Due Date 08/08/25  -         Untimed Charges    OT Eval/Re-eval Minutes 35  -LF         Total Minutes    Untimed Charges Total Minutes 35  -LF       Total Minutes 35  -LF                User Key  (r) = Recorded By, (t) = Taken By, (c) = Cosigned By      Initials Name Provider Type     Amalia Grimes OT Occupational Therapist                  Therapy Charges for Today       Code Description Service Date Service Provider Modifiers Qty    [id number]  OT EVAL LOW COMPLEXITY 3 7/30/2025 Cornelio  Amalia, OT GO 1                 Amalia Grimes, CRISTINA  7/30/2025

## 2025-07-30 NOTE — THERAPY EVALUATION
Acute Care - Physical Therapy Initial Evaluation   [place]     Patient Name: Yumiko Chatman  : 1960  MRN: [id number]  Today's Date: 2025      Visit Dx:     ICD-10-CM ICD-9-CM   1. Small bowel obstruction  K56.609 560.9   2. Incarcerated umbilical hernia  K42.0 552.1   3. Lactic acidosis  E87.20 276.2   4. Decreased activities of daily living (ADL)  Z78.9 V49.89   5. Difficulty walking  R26.2 719.7     Patient Active Problem List   Diagnosis    Incarcerated umbilical hernia    Abnormal results of liver function studies    Anemia due to chronic blood loss    Arthritis    Iron deficiency anemia    Joint pain    Knee pain    Primary malignant neoplasm of skin    Sarcoidosis of lung    Tear of medial meniscus of left knee, initial encounter    Ulcerative colitis    Large liver    S/P Open incisional hernia repair (5 cm) with underlay biologic mesh placement     Past Medical History:   Diagnosis Date    Abnormal results of liver function studies 2025    Anemia due to chronic blood loss 2025    Arthritis 2025    BMI 36.0-36.9,adult     Hiatal hernia     Hyperglycemia due to diabetes mellitus     Hypothyroidism     Iron deficiency anemia 2025    Joint pain 2025    Knee pain 2025    Large liver 2025    Primary malignant neoplasm of skin 2025    S/P Open incisional hernia repair (5 cm) with underlay biologic mesh placement 2025    Sarcoidosis of lung 2025    Tear of medial meniscus of left knee, initial encounter 2015    Ulcerative colitis 2025     Past Surgical History:   Procedure Laterality Date    UMBILICAL HERNIA REPAIR      UMBILICAL HERNIA REPAIR N/A 2025    Procedure: UMBILICAL HERNIA REPAIR;  Surgeon: Quintin Velasco MD;  Location: [facility] MAIN OR;  Service: General;  Laterality: N/A;     PT Assessment (Last 12 Hours)       PT Evaluation and Treatment       Row Name 25 1300          Physical Therapy Time and Intention     Document Type evaluation  -AV     Mode of Treatment individual therapy;physical therapy  -AV       Row Name 07/30/25 1300          General Information    Patient Profile Reviewed yes  -AV     Patient Observations alert;cooperative;agree to therapy  -AV     Prior Level of Function independent:;all household mobility;gait;transfer;ADL's;work  Ambulated without an assistive device. No home O2.  -AV     Equipment Currently Used at Home none  -AV     Existing Precautions/Restrictions other (see comments)  abdominal binder  -AV       Row Name 07/30/25 1300          Living Environment    Current Living Arrangements home  -AV     Home Accessibility stairs within home  -AV     People in Home spouse  -AV       Row Name 07/30/25 1300          Stairs Within Home, Primary    Stairs, Within Home, Primary Flight to second level bedroom and bathroom but patient reports primarily residing on main level  -AV       Row Name 07/30/25 1300          Pain    Pain Location abdomen  -AV     Pain Side/Orientation generalized  -AV     Pre/Posttreatment Pain Comment Patient educated on bracing abdomen with pillow when coughing/sneezing to minimize abdominal pain  -AV     Additional Documentation Pain Scale: FACES Pre/Post-Treatment (Group)  -AV       Row Name 07/30/25 1300          Pain Scale: FACES Pre/Post-Treatment    Pain: FACES Scale, Pretreatment 2-->hurts little bit  -AV     Posttreatment Pain Rating 2-->hurts little bit  -AV       Row Name 07/30/25 1300          Cognition    Orientation Status (Cognition) oriented x 3  -AV       Row Name 07/30/25 1300          Range of Motion (ROM)    Range of Motion bilateral lower extremities;ROM is WFL  -AV       Row Name 07/30/25 1300          Strength (Manual Muscle Testing)    Strength (Manual Muscle Testing) bilateral lower extremities;strength is WFL  -AV       Row Name 07/30/25 1300          Bed Mobility    Comment, (Bed Mobility) Patient seated upright in recliner upon therapist entry  -AV        Row Name 07/30/25 1300          Transfers    Transfers sit-stand transfer;stand-sit transfer;toilet transfer  -AV       Row Name 07/30/25 1300          Sit-Stand Transfer    Sit-Stand [place] (Transfers) supervision  -AV       Row Name 07/30/25 1300          Stand-Sit Transfer    Stand-Sit [place] (Transfers) supervision  -AV       Row Name 07/30/25 1300          Toilet Transfer    Type (Toilet Transfer) sit-stand;stand-sit  -AV     [place] Level (Toilet Transfer) supervision  -AV     Assistive Device (Toilet Transfer) commode chair;grab bars/safety frame  -AV       Row Name 07/30/25 1300          Gait/Stairs (Locomotion)    Gait/Stairs Locomotion gait/ambulation independence;gait/ambulation assistive device;distance ambulated  -AV     [place] Level (Gait) supervision  -AV     Assistive Device (Gait) --  No AD  -AV     Distance in Feet (Gait) 300  + 150'  -AV     Pattern (Gait) step-through  -AV       Row Name 07/30/25 1300          Safety Issues/Impairments Affecting Functional Mobility    Impairments Affecting Function (Mobility) pain  -AV       Row Name 07/30/25 1300          Balance    Balance Assessment standing dynamic balance  -AV     Dynamic Standing Balance supervision  -AV     Position/Device Used, Standing Balance unsupported  -AV       Row Name             Wound 07/29/25 1658 midline abdomen Surgical Closed Surgical Incision    Wound - Properties Group Placement Date: 07/29/25  -AT Placement Time: 1658  -AT Orientation: midline  -AT Location: abdomen  -AT Primary Wound Type: Surgical  -AT Secondary Wound Type - Surgical: Closed Surgi  -AT    Retired Wound - Properties Group Placement Date: 07/29/25  -AT Placement Time: 1658 -AT Orientation: midline  -AT Location: abdomen  -AT    Retired Wound - Properties Group Placement Date: 07/29/25  -AT Placement Time: 1658  -AT Orientation: midline  -AT Location: abdomen  -AT    Retired Wound - Properties Group Date first assessed:  07/29/25  -AT Time first assessed: 1658  -AT Location: abdomen  -AT      Row Name 07/30/25 1300          Plan of Care Review    Plan of Care Reviewed With patient  -AV     Progress no change  -AV     Outcome Evaluation Patient does not present with any significant decline in functional mobility requiring inpatient PT services. She is safe to continue ambulating in the hallways with supervision of staff and to return home once medically able. PT order will be discharged.  -AV       Row Name 07/30/25 1300          Positioning and Restraints    Pre-Treatment Position sitting in chair/recliner  -AV     Post Treatment Position chair  -AV     In Chair sitting;call light within reach;encouraged to call for assist  No alarms active upon therapist entry  -AV       Row Name 07/30/25 1300          Therapy Assessment/Plan (PT)    Criteria for Skilled Interventions Met (PT) no problems identified which require skilled intervention  -AV     Therapy Frequency (PT) evaluation only  -AV       Row Name 07/30/25 1300          PT Evaluation Complexity    History, PT Evaluation Complexity no personal factors and/or comorbidities  -AV     Examination of Body Systems (PT Eval Complexity) total of 4 or more elements  -AV     Clinical Presentation (PT Evaluation Complexity) stable  -AV     Clinical Decision Making (PT Evaluation Complexity) low complexity  -AV     Overall Complexity (PT Evaluation Complexity) low complexity  -AV       Row Name 07/30/25 1300          Therapy Plan Review/Discharge Plan (PT)    Therapy Plan Review (PT) evaluation/treatment results reviewed;patient  -AV       Row Name 07/30/25 1300          Physical Therapy Goals    Problem Specific Goal Selection (PT) problem specific goal 1, PT  -AV       Row Name 07/30/25 1300          Problem Specific Goal 1 (PT)    Problem Specific Goal 1 (PT) Complete PT evaluation  -AV     Time Frame (Problem Specific Goal 1, PT) 1 day  -AV     Progress/Outcome (Problem Specific Goal 1,  PT) goal met  -AV               User Key  (r) = Recorded By, (t) = Taken By, (c) = Cosigned By      Initials Name Provider Type    AV Adam Blankenship, PT Physical Therapist    AT Montes Villagomez, RN Registered Nurse                    Physical Therapy Education       Title: PT OT SLP Therapies (In Progress)       Topic: Physical Therapy (In Progress)       Point: Mobility training (Done)       Learning Progress Summary            Patient Acceptance, E,TB, VU by AV at 7/30/2025 1358                      Point: Home exercise program (Not Started)       Learner Progress:  Not documented in this visit.              Point: Body mechanics (Done)       Learning Progress Summary            Patient Acceptance, E,TB, VU by AV at 7/30/2025 1358                      Point: Precautions (Done)       Learning Progress Summary            Patient Acceptance, E,TB, VU by AV at 7/30/2025 1358                                      User Key       Initials Effective Dates Name Provider Type Discipline    AV 06/11/21 -  Adam Blankenship, PT Physical Therapist PT                  PT Recommendation and Plan  Anticipated Discharge Disposition (PT): home  Therapy Frequency (PT): evaluation only  Plan of Care Reviewed With: patient  Progress: no change  Outcome Evaluation: Patient does not present with any significant decline in functional mobility requiring inpatient PT services. She is safe to continue ambulating in the hallways with supervision of staff and to return home once medically able. PT order will be discharged.   Outcome Measures       Row Name 07/30/25 1300             How much help from another person do you currently need...    Turning from your back to your side while in flat bed without using bedrails? 4  -AV      Moving from lying on back to sitting on the side of a flat bed without bedrails? 3  -AV      Moving to and from a bed to a chair (including a wheelchair)? 4  -AV      Standing up from a chair using your arms  (e.g., wheelchair, bedside chair)? 4  -AV      Climbing 3-5 steps with a railing? 4  -AV      To walk in hospital room? 4  -AV      AM-PAC 6 Clicks Score (PT) 23  -AV         Functional Assessment    Outcome Measure Options AM-PAC 6 Clicks Basic Mobility (PT)  -AV                User Key  (r) = Recorded By, (t) = Taken By, (c) = Cosigned By      Initials Name Provider Type    AV Adam Blankenship, PT Physical Therapist                     Time Calculation:    PT Charges       Row Name 07/30/25 1357             Time Calculation    PT Received On 07/30/25  -AV         Untimed Charges    PT Eval/Re-eval Minutes 45  -AV         Total Minutes    Untimed Charges Total Minutes 45  -AV       Total Minutes 45  -AV                User Key  (r) = Recorded By, (t) = Taken By, (c) = Cosigned By      Initials Name Provider Type    Adam White, PT Physical Therapist                  Therapy Charges for Today       Code Description Service Date Service Provider Modifiers Qty    93043445221 HC PT EVAL LOW COMPLEXITY 3 7/30/2025 Adam Blankenship, PT GP 1            PT G-Codes  Outcome Measure Options: AM-PAC 6 Clicks Basic Mobility (PT)  AM-PAC 6 Clicks Score (PT): 23    Adam Blankenship, PT  7/30/2025

## 2025-07-30 NOTE — PLAN OF CARE
Goal Outcome Evaluation:  Plan of Care Reviewed With: patient        Progress: no change  Outcome Evaluation: Patient does not present with any significant decline in functional mobility requiring inpatient PT services. She is safe to continue ambulating in the hallways with supervision of staff and to return home once medically able. PT order will be discharged.    Anticipated Discharge Disposition (PT): home

## 2025-07-30 NOTE — PLAN OF CARE
Goal Outcome Evaluation:  Plan of Care Reviewed With: patient        Progress: improving  Outcome Evaluation: Patient AOx4, VSS, up x1 assist. Ambulated in shepard x2. IVF continued at 70ml/hr. NPO with ice chips only. NG to LWS. Dressing clean dry intact. No acute changes.

## 2025-07-30 NOTE — PROGRESS NOTES
"POST OP PROGRESS NOTE     Patient Name:  Yumiko Chatman  YOB: 1960  [id number]   LOS: 1 day   1 Day Post-Op            Subjective     Interval History:   VSS, afebrile, no flatus or BM, up in chair at bedside, pain controlled    Review of Systems:    A complete review of systems was performed and all are negative except what is documented in the HPI.       Objective     Constitutional:  well nourished, no acute distress, appears stated age /73 (BP Location: Left arm, Patient Position: Lying)   Pulse 103   Temp 98.4 °F (36.9 °C) (Oral)   Resp 16   Ht 157.5 cm (62\")   Wt 90 kg (198 lb 6.6 oz)   LMP  (LMP Unknown)   SpO2 95%   BMI 36.29 kg/m²    Eyes:  anicteric sclerae, moist conjunctivae, no lid lag, PERRLA  ENMT:  oropharynx clear, moist mucous membranes  Neck:   full ROM, trachea midline  Cardiovascular:  heart rate 103, no pedal edema  Respiratory:  respirations even and unlabored  GI:  Abdomen soft, appropriately tender     Skin:  warm and dry, normal turgor, no rashes  Psychiatric:  alert and oriented x 4,  cooperative          Results Review:       I reviewed the patient's new clinical results including  CBC, BMP.     WBC   Date Value Ref Range Status   07/30/2025 11.20 (H) 3.40 - 10.80 10*3/mm3 Final     RBC   Date Value Ref Range Status   07/30/2025 3.91 3.77 - 5.28 10*6/mm3 Final     Hemoglobin   Date Value Ref Range Status   07/30/2025 11.2 (L) 12.0 - 15.9 g/dL Final     Hematocrit   Date Value Ref Range Status   07/30/2025 36.0 34.0 - 46.6 % Final     MCV   Date Value Ref Range Status   07/30/2025 92.1 79.0 - 97.0 fL Final     MCH   Date Value Ref Range Status   07/30/2025 28.6 26.6 - 33.0 pg Final     MCHC   Date Value Ref Range Status   07/30/2025 31.1 (L) 31.5 - 35.7 g/dL Final     RDW   Date Value Ref Range Status   07/30/2025 14.6 12.3 - 15.4 % Final     RDW-SD   Date Value Ref Range Status   07/30/2025 49.1 37.0 - 54.0 fl Final     MPV   Date Value Ref Range Status " "  07/30/2025 9.8 6.0 - 12.0 fL Final     Platelets   Date Value Ref Range Status   07/30/2025 364 140 - 450 10*3/mm3 Final     Neutrophil %   Date Value Ref Range Status   07/30/2025 87.1 (H) 42.7 - 76.0 % Final     Lymphocyte %   Date Value Ref Range Status   07/30/2025 6.6 (L) 19.6 - 45.3 % Final     Monocyte %   Date Value Ref Range Status   07/30/2025 5.8 5.0 - 12.0 % Final     Eosinophil %   Date Value Ref Range Status   07/30/2025 0.0 (L) 0.3 - 6.2 % Final     Basophil %   Date Value Ref Range Status   07/30/2025 0.3 0.0 - 1.5 % Final     Immature Grans %   Date Value Ref Range Status   07/30/2025 0.2 0.0 - 0.5 % Final     Neutrophils, Absolute   Date Value Ref Range Status   07/30/2025 9.76 (H) 1.70 - 7.00 10*3/mm3 Final     Lymphocytes, Absolute   Date Value Ref Range Status   07/30/2025 0.74 0.70 - 3.10 10*3/mm3 Final     Monocytes, Absolute   Date Value Ref Range Status   07/30/2025 0.65 0.10 - 0.90 10*3/mm3 Final     Eosinophils, Absolute   Date Value Ref Range Status   07/30/2025 0.00 0.00 - 0.40 10*3/mm3 Final     Basophils, Absolute   Date Value Ref Range Status   07/30/2025 0.03 0.00 - 0.20 10*3/mm3 Final     Immature Grans, Absolute   Date Value Ref Range Status   07/30/2025 0.02 0.00 - 0.05 10*3/mm3 Final     nRBC   Date Value Ref Range Status   07/30/2025 0.0 0.0 - 0.2 /100 WBC Final         Basic Metabolic Panel    Sodium Sodium   Date Value Ref Range Status   07/30/2025 140 136 - 145 mmol/L Final   07/29/2025 141 136 - 145 mmol/L Final      Potassium Potassium   Date Value Ref Range Status   07/30/2025 4.7 3.5 - 5.2 mmol/L Final   07/29/2025 5.0 3.5 - 5.2 mmol/L Final      Chloride Chloride   Date Value Ref Range Status   07/30/2025 105 98 - 107 mmol/L Final   07/29/2025 100 98 - 107 mmol/L Final      Bicarbonate No results found for: \"PLASMABICARB\"   BUN BUN   Date Value Ref Range Status   07/30/2025 14.7 8.0 - 23.0 mg/dL Final   07/29/2025 17.2 8.0 - 23.0 mg/dL Final      Creatinine Creatinine " "  Date Value Ref Range Status   07/30/2025 0.70 0.57 - 1.00 mg/dL Final   07/29/2025 0.90 0.57 - 1.00 mg/dL Final      Calcium Calcium   Date Value Ref Range Status   07/30/2025 8.7 8.6 - 10.5 mg/dL Final   07/29/2025 10.5 8.6 - 10.5 mg/dL Final      Glucose      No components found for: \"GLUCOSE.*\"       Lab Results   Component Value Date    GLUCOSE 152 (H) 07/30/2025    BUN 14.7 07/30/2025    CREATININE 0.70 07/30/2025     07/30/2025    K 4.7 07/30/2025     07/30/2025    CALCIUM 8.7 07/30/2025    PROTEINTOT 6.6 07/30/2025    ALBUMIN 4.0 07/30/2025    ALT 33 07/30/2025    AST 22 07/30/2025    ALKPHOS 105 07/30/2025    BILITOT 0.4 07/30/2025    GLOB 2.6 07/30/2025    AGRATIO 1.5 07/30/2025    BCR 21.0 07/30/2025    ANIONGAP 9.4 07/30/2025    EGFR 96.1 07/30/2025       IMAGING:  Imaging Results (Last 72 Hours)       Procedure Component Value Units Date/Time    CT Abdomen Pelvis With Contrast [[id number]] Collected: 07/29/25 1253     Updated: 07/29/25 1302    Narrative:      CT ABDOMEN PELVIS W CONTRAST    Date of Exam: 7/29/2025 12:34 PM EDT    Indication: Eval epigastric abdominal pain and distention and vomiting since last night, elevated lactic acid of 5, elevated white blood cell count of 17.    Comparison: None available    Technique: Axial CT images were obtained of the abdomen and pelvis after the uneventful intravenous administration of iodinated contrast. Reconstructed coronal and sagittal images were also obtained. Automated exposure control and iterative construction   methods were used.      Findings:  The heart size is normal. There is no pericardial effusion. The lung bases are clear.    The liver is enlarged measuring 20 cm in craniocaudal dimension. There is diffusely decreased density of the liver parenchyma compatible with diffuse fatty steatosis. The portal veins and hepatic veins are patent. There is a small cyst within the left   hepatic lobe of the liver. The gallbladder is present " without wall thickening. There is no intrahepatic or extrahepatic biliary ductal dilatation.    The spleen, adrenal glands, and pancreas appear within normal limits.    The kidneys are symmetric in size and enhancement. There is no evidence of urolithiasis or hydronephrosis. There is a small cyst within the lateral aspect of the left kidney. The urinary bladder is fluid-filled without wall thickening.    There is a small sliding-type hiatal hernia. There is a small bowel containing supraumbilical hernia with associated transition point compatible with small bowel obstruction. There is upstream fecalization and small bowel distention. The terminal ileum   and colon is collapsed.    The aorta is normal in caliber without evidence of aneurysm formation. There is no abdominal or pelvic lymphadenopathy. There is degenerative disc disease of the lumbar spine.      Impression:      Impression:  1.Small bowel obstruction with transition point within a supraumbilical hernia. Recommend surgical consultation.  2.Hepatomegaly with diffuse fatty steatosis.        Electronically Signed: Luis Denise MD    7/29/2025 1:00 PM EDT    Workstation ID: [id number]            Medications:    Current Facility-Administered Medications:     acetaminophen (OFIRMEV) injection 1,000 mg, 1,000 mg, Intravenous, Q8H PRN, Quintin Velasco MD    acetaminophen (TYLENOL) tablet 650 mg, 650 mg, Oral, Q6H PRN, Quintin Velasco MD    aluminum-magnesium hydroxide-simethicone (MAALOX MAX) 400-400-40 MG/5ML suspension 15 mL, 15 mL, Oral, Q6H PRN, Quintin Velasco MD    artificial tears ophthalmic ointment, , Both Eyes, Q1H PRN, Quintin Velasco MD    benzocaine-menthol (CHLORASEPTIC) lozenge 1 each, 1 lozenge, Mouth/Throat, Q2H PRN, Quintin Velasco MD    Diclofenac Sodium (VOLTAREN) 1 % gel 2 g, 2 g, Topical, 4x Daily PRN, Quintin Velasco MD    HYDROcodone-acetaminophen (NORCO) 5-325 MG per tablet 1 tablet, 1 tablet, Nasogastric, Q6H PRN, Rod  MD Quintin    HYDROmorphone (DILAUDID) injection 0.5 mg, 0.5 mg, Intravenous, Q2H PRN, Quintin Velasco MD, 0.25 mg at 07/30/25 0017    hydrOXYzine (ATARAX) tablet 25 mg, 25 mg, Oral, TID PRN, Quintin Velasco MD    lactated ringers infusion, 70 mL/hr, Intravenous, Continuous, Rosas Kasper MD, Last Rate: 70 mL/hr at 07/30/25 0857, 70 mL/hr at 07/30/25 0857    [Held by provider] levothyroxine (SYNTHROID, LEVOTHROID) tablet 100 mcg, 100 mcg, Oral, Daily, Rosas Kasper MD    Lidocaine (Anorectal) (LMX 5) 5 % cream cream 1 Application, 1 Application, Topical, TID PRN, Quintin Velasco MD    Lidocaine 4 % 1 patch, 1 patch, Transdermal, Daily PRN, Quintin Velasco MD    melatonin tablet 5 mg, 5 mg, Oral, Nightly PRN, Quintin Velasco MD    nicotine (NICODERM CQ) 21 MG/24HR patch 1 patch, 1 patch, Transdermal, Daily PRN, Quintin Velasco MD    ondansetron (ZOFRAN) injection 4 mg, 4 mg, Intravenous, Q4H PRN, Quintin Velasco MD    potassium phosphate 15 mmol in 0.9% normal saline 250 mL IVPB, 15 mmol, Intravenous, Once, Rosas Kasper MD, 15 mmol at 07/30/25 0856    scopolamine patch 1 mg/72 hr, 1 patch, Transdermal, Once, Quintin Velasco MD    sodium chloride 0.9 % flush 10 mL, 10 mL, Intravenous, PRN, Quitnin Velasco MD    [COMPLETED] Insert Peripheral IV, , , Once **AND** sodium chloride 0.9 % flush 10 mL, 10 mL, Intravenous, PRN, Quintin Velasco MD    sodium chloride 0.9 % flush 10 mL, 10 mL, Intravenous, Q12H, Quintin Velasco MD, 10 mL at 07/30/25 0716    sodium chloride 0.9 % flush 10 mL, 10 mL, Intravenous, PRN, Quintin Velasco MD    sodium chloride 0.9 % infusion 40 mL, 40 mL, Intravenous, PRN, Quintin Velasco MD    Assessment & Plan       Incarcerated umbilical hernia    S/P Open incisional hernia repair (5 cm) with underlay biologic mesh placement    Ambulate in hallway  Up in chair  Incentive spirometer  Labs in AM  Continue NG tube  May have ice chips  Protonix for GI prophylaxis and  heartburn        Electronically signed by Maite Nunez, KRISTINE, 07/30/25, 9:14 AM EDT.

## 2025-07-31 LAB
ANION GAP SERPL CALCULATED.3IONS-SCNC: 8.8 MMOL/L (ref 5–15)
BASOPHILS # BLD AUTO: 0.03 10*3/MM3 (ref 0–0.2)
BASOPHILS NFR BLD AUTO: 0.3 % (ref 0–1.5)
BUN SERPL-MCNC: 20.6 MG/DL (ref 8–23)
BUN/CREAT SERPL: 27.1 (ref 7–25)
CALCIUM SPEC-SCNC: 8.7 MG/DL (ref 8.6–10.5)
CHLORIDE SERPL-SCNC: 105 MMOL/L (ref 98–107)
CO2 SERPL-SCNC: 26.2 MMOL/L (ref 22–29)
CREAT SERPL-MCNC: 0.76 MG/DL (ref 0.57–1)
DEPRECATED RDW RBC AUTO: 50.2 FL (ref 37–54)
EGFRCR SERPLBLD CKD-EPI 2021: 87.1 ML/MIN/1.73
EOSINOPHIL # BLD AUTO: 0.1 10*3/MM3 (ref 0–0.4)
EOSINOPHIL NFR BLD AUTO: 1 % (ref 0.3–6.2)
ERYTHROCYTE [DISTWIDTH] IN BLOOD BY AUTOMATED COUNT: 14.6 % (ref 12.3–15.4)
GLUCOSE SERPL-MCNC: 110 MG/DL (ref 65–99)
HCT VFR BLD AUTO: 33 % (ref 34–46.6)
HGB BLD-MCNC: 10.4 G/DL (ref 12–15.9)
IMM GRANULOCYTES # BLD AUTO: 0.02 10*3/MM3 (ref 0–0.05)
IMM GRANULOCYTES NFR BLD AUTO: 0.2 % (ref 0–0.5)
LYMPHOCYTES # BLD AUTO: 2.69 10*3/MM3 (ref 0.7–3.1)
LYMPHOCYTES NFR BLD AUTO: 28.2 % (ref 19.6–45.3)
MAGNESIUM SERPL-MCNC: 2.1 MG/DL (ref 1.6–2.4)
MCH RBC QN AUTO: 29.3 PG (ref 26.6–33)
MCHC RBC AUTO-ENTMCNC: 31.5 G/DL (ref 31.5–35.7)
MCV RBC AUTO: 93 FL (ref 79–97)
MONOCYTES # BLD AUTO: 0.93 10*3/MM3 (ref 0.1–0.9)
MONOCYTES NFR BLD AUTO: 9.8 % (ref 5–12)
NEUTROPHILS NFR BLD AUTO: 5.76 10*3/MM3 (ref 1.7–7)
NEUTROPHILS NFR BLD AUTO: 60.5 % (ref 42.7–76)
NRBC BLD AUTO-RTO: 0 /100 WBC (ref 0–0.2)
PHOSPHATE SERPL-MCNC: 1.9 MG/DL (ref 2.5–4.5)
PLATELET # BLD AUTO: 310 10*3/MM3 (ref 140–450)
PMV BLD AUTO: 10.1 FL (ref 6–12)
POTASSIUM SERPL-SCNC: 3.5 MMOL/L (ref 3.5–5.2)
RBC # BLD AUTO: 3.55 10*6/MM3 (ref 3.77–5.28)
SODIUM SERPL-SCNC: 140 MMOL/L (ref 136–145)
WBC NRBC COR # BLD AUTO: 9.53 10*3/MM3 (ref 3.4–10.8)

## 2025-07-31 PROCEDURE — 25010000002 HYDROMORPHONE 1 MG/ML SOLUTION: Performed by: STUDENT IN AN ORGANIZED HEALTH CARE EDUCATION/TRAINING PROGRAM

## 2025-07-31 PROCEDURE — 84100 ASSAY OF PHOSPHORUS: CPT | Performed by: STUDENT IN AN ORGANIZED HEALTH CARE EDUCATION/TRAINING PROGRAM

## 2025-07-31 PROCEDURE — 94799 UNLISTED PULMONARY SVC/PX: CPT

## 2025-07-31 PROCEDURE — 80048 BASIC METABOLIC PNL TOTAL CA: CPT | Performed by: INTERNAL MEDICINE

## 2025-07-31 PROCEDURE — 99232 SBSQ HOSP IP/OBS MODERATE 35: CPT | Performed by: INTERNAL MEDICINE

## 2025-07-31 PROCEDURE — 36415 COLL VENOUS BLD VENIPUNCTURE: CPT | Performed by: INTERNAL MEDICINE

## 2025-07-31 PROCEDURE — 83735 ASSAY OF MAGNESIUM: CPT | Performed by: STUDENT IN AN ORGANIZED HEALTH CARE EDUCATION/TRAINING PROGRAM

## 2025-07-31 PROCEDURE — 25810000003 SODIUM CHLORIDE 0.9 % SOLUTION

## 2025-07-31 PROCEDURE — 85025 COMPLETE CBC W/AUTO DIFF WBC: CPT | Performed by: STUDENT IN AN ORGANIZED HEALTH CARE EDUCATION/TRAINING PROGRAM

## 2025-07-31 PROCEDURE — 99231 SBSQ HOSP IP/OBS SF/LOW 25: CPT | Performed by: STUDENT IN AN ORGANIZED HEALTH CARE EDUCATION/TRAINING PROGRAM

## 2025-07-31 RX ORDER — FENTANYL/ROPIVACAINE/NS/PF 2-625MCG/1
15 PLASTIC BAG, INJECTION (ML) EPIDURAL
Status: COMPLETED | OUTPATIENT
Start: 2025-07-31 | End: 2025-07-31

## 2025-07-31 RX ADMIN — PANTOPRAZOLE SODIUM 40 MG: 40 INJECTION, POWDER, FOR SOLUTION INTRAVENOUS at 04:42

## 2025-07-31 RX ADMIN — Medication 10 ML: at 21:25

## 2025-07-31 RX ADMIN — PHENOL 1 SPRAY: 1.5 LIQUID ORAL at 09:58

## 2025-07-31 RX ADMIN — POTASSIUM PHOSPHATE 15 MMOL: 236; 224 INJECTION, SOLUTION INTRAVENOUS at 11:12

## 2025-07-31 RX ADMIN — Medication 10 ML: at 08:30

## 2025-07-31 RX ADMIN — PHENOL 1 SPRAY: 1.5 LIQUID ORAL at 14:31

## 2025-07-31 RX ADMIN — HYDROMORPHONE HYDROCHLORIDE 0.5 MG: 1 INJECTION, SOLUTION INTRAMUSCULAR; INTRAVENOUS; SUBCUTANEOUS at 09:59

## 2025-07-31 RX ADMIN — POTASSIUM PHOSPHATE 15 MMOL: 236; 224 INJECTION, SOLUTION INTRAVENOUS at 07:56

## 2025-07-31 NOTE — SIGNIFICANT NOTE
07/31/25 0939   OTHER   Discipline occupational therapist   Rehab Time/Intention   Session Not Performed patient/family declined treatment

## 2025-07-31 NOTE — PLAN OF CARE
Goal Outcome Evaluation:              Outcome Evaluation: pt stable through night, no complaints of pain

## 2025-07-31 NOTE — PLAN OF CARE
Goal Outcome Evaluation:              Outcome Evaluation: Pt had small bowel movement, 200ml out NG tube, pt walked shepard x3, treated pt for pain x1, effective, removed NG tube per MD, pt on clear liquid diet, tolerating well, no nausea noted.

## 2025-07-31 NOTE — PROGRESS NOTES
[facility]   Hospitalist Progress Note    Date of admission: 7/29/2025  Patient Name: Yumiko Chatman  1960  Date: 7/31/2025      Subjective     Chief Complaint   Patient presents with    Abdominal Pain     Abdominal pain with nausea and vomiting x 1 day       Interval Followup: Some flatus but still no bowel function otherwise.  Still having some nausea and requiring NG with intermittent wall suction.  Pain control reasonable on current regimen  .  Sinus bradycardia regularly.  Increase use today    Objective     Vitals:   Temp:  [97.5 °F (36.4 °C)-98.8 °F (37.1 °C)] 98.2 °F (36.8 °C)  Heart Rate:  [] 88  Resp:  [16-20] 18  BP: (112-133)/(62-76) 133/74    Physical Exam  Awake, conversant  Continue with dark greenish colored fluid in the tubing  CTAB no wheezing room air  RRR  Abdomen nondistended, obese, dressing in place     Result Review:  Vital signs, labs and recent relevant imaging reviewed.      CBC          7/29/2025    11:03 7/30/2025    03:25 7/31/2025    04:41   CBC   WBC 17.88  11.20  9.53    RBC 4.99  3.91  3.55    Hemoglobin 14.7  11.2  10.4    Hematocrit 45.6  36.0  33.0    MCV 91.4  92.1  93.0    MCH 29.5  28.6  29.3    MCHC 32.2  31.1  31.5    RDW 14.1  14.6  14.6    Platelets 423  364  310      CMP          7/29/2025    11:03 7/30/2025    03:25 7/31/2025    04:41   CMP   Glucose 166  152  110    BUN 17.2  14.7  20.6    Creatinine 0.90  0.70  0.76    EGFR 71.1  96.1  87.1    Sodium 141  140  140    Potassium 5.0  4.7  3.5    Chloride 100  105  105    Calcium 10.5  8.7  8.7    Total Protein 8.5  6.6     Albumin 5.0  4.0     Globulin 3.5  2.6     Total Bilirubin 0.3  0.4     Alkaline Phosphatase 151  105     AST (SGOT) 39  22     ALT (SGPT) 50  33     Albumin/Globulin Ratio 1.4  1.5     BUN/Creatinine Ratio 19.1  21.0  27.1    Anion Gap 18.4  9.4  8.8          acetaminophen    aluminum-magnesium hydroxide-simethicone    artificial tears    benzocaine-menthol    Diclofenac Sodium     HYDROcodone-acetaminophen    HYDROmorphone    hydrOXYzine    Lidocaine (Anorectal)    Lidocaine    melatonin    nicotine    ondansetron    phenol    sodium chloride    [COMPLETED] Insert Peripheral IV **AND** sodium chloride    sodium chloride    sodium chloride    [Held by provider] levothyroxine, 100 mcg, Oral, Daily  pantoprazole, 40 mg, Intravenous, Q AM  Scopolamine, 1 patch, Transdermal, Once  sodium chloride, 10 mL, Intravenous, Q12H        CT Abdomen Pelvis With Contrast  Result Date: 7/29/2025  Impression: 1.Small bowel obstruction with transition point within a supraumbilical hernia. Recommend surgical consultation. 2.Hepatomegaly with diffuse fatty steatosis. Electronically Signed: Luis Denise MD  7/29/2025 1:00 PM EDT  Workstation ID: [id number]      Assessment / Plan     Summary: 65 y.o. with history of umbilical hernia repair otherwise no significant abdominal surgeries, has prediabetes and hypothyroidism, had acute onset of abdominal pain found to have incarcerated umbilical hernia.  Patient went to the OR on 7/29 for open incisional hernia repair with biologic mesh placement.    Assessment/Plan (clinically significant if listed here)  SBO with transition point with incarcerated umbilical hernia in setting of remote history of umbilical hernia repair  Hx of umbilical hernia repair  Lactic acidosis  Leukocytosis suspect reactive in setting of SBO  Morbid obesity BMI of 36  Mildly elevated transaminases suspect in setting of hepatomegaly and diffuse fatty steatosis   Hypothyroidism    Continue perioperative monitoring and monitoring for resumption of bowel function wound care for postoperative dressing as per surgery appreciate assistance, diet as per their evaluation - remains on ice chips alone for now still per discussion  NG to LWS   Cont ppi for gerd, likely needs at baseline as well even prior to this  Monitor/replace electrolytes  Incentive spirometry, monitor oxygenation/sedation, increase  ambulation as able.  O2 on lower end suspect atelectasis related and possible iv fluid component  Replace phos, potassium iv  Wbc down to normal, suspect initial elevation reactive from sbo.    Patient is prediabetic A1c 6.1, no need for insulin is only on Farxiga outpatient  IV pain medication, monitor sedation is opiate naïve otherwise fortunately not having to significant of symptoms, iv offirmev prn to try and limit total dosing.   TSH normal limits, on Synthroid at hold until able to resume.  No need for IV conversion at this time  As needed antiemetics  Leukocytosis improving supportive care suspect reactive.  Did have standard prophylactic perioperative antibiotics but otherwise no additional needs at this time  PT/OT  Check a.m. CBC, BMP, magnesium, phosphorus    Dispo: likely home once medically stable.     VTE Prophylaxis:  Mechanical VTE prophylaxis orders are present.      Code Status (Patient has no pulse and is not breathing): CPR (Attempt to Resuscitate)  Medical Interventions (Patient has pulse or is breathing): Full Support

## 2025-07-31 NOTE — PROGRESS NOTES
[facility]     Surgery Progress Note    Patient Name: Yumiko Chatman  :    1960  MRN:    [id number]  Date of admission:  2025  Length of Stay: 2 days    Subjective   65 year old female with SBO from incarcerated umbilical hernia status post open incisional hernia repair with underlay biologic mesh placement ()    No acute events overnight.  Passing flatus, but no bowel movement.  Pain well-controlled.  Tolerating some ice chips with NG tube in place.  Ambulating in the halls.  Objective     Current Facility-Administered Medications:     acetaminophen (OFIRMEV) injection 1,000 mg, 1,000 mg, Intravenous, Q8H PRN, Quintin Velasco MD    acetaminophen (TYLENOL) tablet 650 mg, 650 mg, Oral, Q6H PRN, Rosas Kasper MD    aluminum-magnesium hydroxide-simethicone (MAALOX MAX) 400-400-40 MG/5ML suspension 15 mL, 15 mL, Oral, Q6H PRN, Rosas Kasper MD    artificial tears ophthalmic ointment, , Both Eyes, Q1H PRN, Quintin Velasco MD    benzocaine-menthol (CHLORASEPTIC) lozenge 1 each, 1 lozenge, Mouth/Throat, Q2H PRN, Quintin Velasco MD    Diclofenac Sodium (VOLTAREN) 1 % gel 2 g, 2 g, Topical, 4x Daily PRN, Quintin Velasco MD    HYDROcodone-acetaminophen (NORCO) 5-325 MG per tablet 1 tablet, 1 tablet, Oral, Q6H PRN, Rosas Kasper MD    HYDROmorphone (DILAUDID) injection 0.5 mg, 0.5 mg, Intravenous, Q2H PRN, Quintin Velasco MD, 0.5 mg at 25    hydrOXYzine (ATARAX) tablet 25 mg, 25 mg, Oral, TID PRN, Rosas Kasper MD    [Held by provider] levothyroxine (SYNTHROID, LEVOTHROID) tablet 100 mcg, 100 mcg, Oral, Daily, Rosas Kasper MD    Lidocaine (Anorectal) (LMX 5) 5 % cream cream 1 Application, 1 Application, Topical, TID PRN, Quintin Velasco MD    Lidocaine 4 % 1 patch, 1 patch, Transdermal, Daily PRN, Quintin Velasco MD    melatonin tablet 5 mg, 5 mg, Oral, Nightly PRN, Rosas Kasper MD    nicotine (NICODERM CQ) 21 MG/24HR patch 1 patch, 1 patch, Transdermal, Daily PRN,  Quintin Velasco MD    ondansetron (ZOFRAN) injection 4 mg, 4 mg, Intravenous, Q4H PRN, Quintin Velasco MD    pantoprazole (PROTONIX) injection 40 mg, 40 mg, Intravenous, Q AM, Maite Nunez APRN, 40 mg at 07/31/25 0442    potassium phosphate 15 mmol in 0.9% normal saline 250 mL IVPB, 15 mmol, Intravenous, Q3H, Michelle Mendoza PA-C, 15 mmol at 07/31/25 0756    scopolamine patch 1 mg/72 hr, 1 patch, Transdermal, Once, Quintin Velasco MD    sodium chloride 0.9 % flush 10 mL, 10 mL, Intravenous, PRN, Quintin Velasco MD    [COMPLETED] Insert Peripheral IV, , , Once **AND** sodium chloride 0.9 % flush 10 mL, 10 mL, Intravenous, PRN, Quintin Velasco MD    sodium chloride 0.9 % flush 10 mL, 10 mL, Intravenous, Q12H, Quintin Velasco MD, 10 mL at 07/31/25 0830    sodium chloride 0.9 % flush 10 mL, 10 mL, Intravenous, PRN, Quintin Velasco MD    sodium chloride 0.9 % infusion 40 mL, 40 mL, Intravenous, PRN, Quintin Velasco MD    Current Diet:    Dietary Orders (From admission, onward)       Start     Ordered    07/30/25 1246  NPO Diet NPO Type: Ice Chips  Diet Effective Now        Question:  NPO Type  Answer:  Ice Chips    07/30/25 1245                    Vitals:   Temp:  [97.3 °F (36.3 °C)-99.3 °F (37.4 °C)] 97.5 °F (36.4 °C)  Heart Rate:  [] 79  Resp:  [16-20] 20  BP: (112-131)/(66-76) 131/73  Physical Exam   Abdomen: Soft, appropriate incisional tenderness, dressing without strikethrough    LABS:  CBC          7/29/2025    11:03 7/30/2025    03:25 7/31/2025    04:41   CBC   WBC 17.88  11.20  9.53    RBC 4.99  3.91  3.55    Hemoglobin 14.7  11.2  10.4    Hematocrit 45.6  36.0  33.0    MCV 91.4  92.1  93.0    MCH 29.5  28.6  29.3    MCHC 32.2  31.1  31.5    RDW 14.1  14.6  14.6    Platelets 423  364  310      BMP          7/29/2025    11:03 7/30/2025    03:25 7/31/2025    04:41   BMP   BUN 17.2  14.7  20.6    Creatinine 0.90  0.70  0.76    Sodium 141  140  140    Potassium 5.0  4.7  3.5    Chloride 100   105  105    CO2 22.6  25.6  26.2    Calcium 10.5  8.7  8.7      CMP          7/29/2025    11:03 7/30/2025    03:25 7/31/2025    04:41   CMP   Glucose 166  152  110    BUN 17.2  14.7  20.6    Creatinine 0.90  0.70  0.76    EGFR 71.1  96.1  87.1    Sodium 141  140  140    Potassium 5.0  4.7  3.5    Chloride 100  105  105    Calcium 10.5  8.7  8.7    Total Protein 8.5  6.6     Albumin 5.0  4.0     Globulin 3.5  2.6     Total Bilirubin 0.3  0.4     Alkaline Phosphatase 151  105     AST (SGOT) 39  22     ALT (SGPT) 50  33     Albumin/Globulin Ratio 1.4  1.5     BUN/Creatinine Ratio 19.1  21.0  27.1    Anion Gap 18.4  9.4  8.8        Lab Results (last 24 hours)       Procedure Component Value Units Date/Time    Phosphorus [[id number]]  (Abnormal) Collected: 07/31/25 0441    Specimen: Blood Updated: 07/31/25 0624     Phosphorus 1.9 mg/dL     Magnesium [[id number]]  (Normal) Collected: 07/31/25 0441    Specimen: Blood Updated: 07/31/25 0616     Magnesium 2.1 mg/dL     Basic Metabolic Panel [[id number]]  (Abnormal) Collected: 07/31/25 0441    Specimen: Blood Updated: 07/31/25 0616     Glucose 110 mg/dL      BUN 20.6 mg/dL      Creatinine 0.76 mg/dL      Sodium 140 mmol/L      Potassium 3.5 mmol/L      Chloride 105 mmol/L      CO2 26.2 mmol/L      Calcium 8.7 mg/dL      BUN/Creatinine Ratio 27.1     Anion Gap 8.8 mmol/L      eGFR 87.1 mL/min/1.73     Narrative:      GFR Categories in Chronic Kidney Disease (CKD)              GFR Category          GFR (mL/min/1.73)    Interpretation  G1                    90 or greater        Normal or high (1)  G2                    60-89                Mild decrease (1)  G3a                   45-59                Mild to moderate decrease  G3b                   30-44                Moderate to severe decrease  G4                    15-29                Severe decrease  G5                    14 or less           Kidney failure    (1)In the absence of evidence of kidney disease, neither GFR  category G1 or G2 fulfill the criteria for CKD.    eGFR calculation 2021 CKD-EPI creatinine equation, which does not include race as a factor    CBC & Differential [[id number]]  (Abnormal) Collected: 07/31/25 0441    Specimen: Blood Updated: 07/31/25 0550    Narrative:      The following orders were created for panel order CBC & Differential.  Procedure                               Abnormality         Status                     ---------                               -----------         ------                     CBC Auto Differential[[id number]]        Abnormal            Final result                 Please view results for these tests on the individual orders.    CBC Auto Differential [[id number]]  (Abnormal) Collected: 07/31/25 0441    Specimen: Blood Updated: 07/31/25 0550     WBC 9.53 10*3/mm3      RBC 3.55 10*6/mm3      Hemoglobin 10.4 g/dL      Hematocrit 33.0 %      MCV 93.0 fL      MCH 29.3 pg      MCHC 31.5 g/dL      RDW 14.6 %      RDW-SD 50.2 fl      MPV 10.1 fL      Platelets 310 10*3/mm3      Neutrophil % 60.5 %      Lymphocyte % 28.2 %      Monocyte % 9.8 %      Eosinophil % 1.0 %      Basophil % 0.3 %      Immature Grans % 0.2 %      Neutrophils, Absolute 5.76 10*3/mm3      Lymphocytes, Absolute 2.69 10*3/mm3      Monocytes, Absolute 0.93 10*3/mm3      Eosinophils, Absolute 0.10 10*3/mm3      Basophils, Absolute 0.03 10*3/mm3      Immature Grans, Absolute 0.02 10*3/mm3      nRBC 0.0 /100 WBC     POC Glucose Once [[id number]]  (Normal) Collected: 07/30/25 1629    Specimen: Blood Updated: 07/30/25 1631     Glucose 97 mg/dL      Comment: Serial Number: [id number]:  [id number]       Hemoglobin A1c [[id number]]  (Abnormal) Collected: 07/30/25 0325    Specimen: Blood Updated: 07/30/25 1618     Hemoglobin A1C 6.10 %     Narrative:      Hemoglobin A1C Ranges:    Increased Risk for Diabetes  5.7% to 6.4%  Diabetes                     >= 6.5%  Diabetic Goal                < 7.0%            IMAGING:  Imaging  Results (Last 24 Hours)       ** No results found for the last 24 hours. **            [x]  Laboratory  []  Microbiology  []  Radiology  []  EKG/Telemetry   []  Cardiology/Vascular   []  Pathology  []  Old records    Assessment / Plan   Assessment:   Active Hospital Problems    Diagnosis     **Incarcerated umbilical hernia     S/P Open incisional hernia repair (5 cm) with underlay biologic mesh placement          Plan:    SBO  - Afebrile, vitals stable, leukocytosis resolved  -Status post open umbilical hernia repair with underlay biologic mesh placement (7/29)  -Continue NG tube to low intermittent wall suction  -Ambulation, incentive spirometer use  -Await initiation of diet until patient has full return of bowel function  -No plans for any acute surgical intervention, will continue to follow     Electronically signed by Quintin Velasco MD, 07/31/25, 8:39 AM EDT.

## 2025-08-01 VITALS
HEART RATE: 71 BPM | TEMPERATURE: 97.7 F | SYSTOLIC BLOOD PRESSURE: 146 MMHG | OXYGEN SATURATION: 100 % | BODY MASS INDEX: 36.51 KG/M2 | WEIGHT: 198.41 LBS | RESPIRATION RATE: 18 BRPM | DIASTOLIC BLOOD PRESSURE: 65 MMHG | HEIGHT: 62 IN

## 2025-08-01 LAB
ANION GAP SERPL CALCULATED.3IONS-SCNC: 9.3 MMOL/L (ref 5–15)
BASOPHILS # BLD AUTO: 0.02 10*3/MM3 (ref 0–0.2)
BASOPHILS NFR BLD AUTO: 0.2 % (ref 0–1.5)
BUN SERPL-MCNC: 13.2 MG/DL (ref 8–23)
BUN/CREAT SERPL: 22.4 (ref 7–25)
CALCIUM SPEC-SCNC: 8.7 MG/DL (ref 8.6–10.5)
CHLORIDE SERPL-SCNC: 103 MMOL/L (ref 98–107)
CO2 SERPL-SCNC: 24.7 MMOL/L (ref 22–29)
CREAT SERPL-MCNC: 0.59 MG/DL (ref 0.57–1)
DEPRECATED RDW RBC AUTO: 48.3 FL (ref 37–54)
EGFRCR SERPLBLD CKD-EPI 2021: 100.2 ML/MIN/1.73
EOSINOPHIL # BLD AUTO: 0.19 10*3/MM3 (ref 0–0.4)
EOSINOPHIL NFR BLD AUTO: 2.1 % (ref 0.3–6.2)
ERYTHROCYTE [DISTWIDTH] IN BLOOD BY AUTOMATED COUNT: 14.3 % (ref 12.3–15.4)
GLUCOSE SERPL-MCNC: 110 MG/DL (ref 65–99)
HCT VFR BLD AUTO: 31.9 % (ref 34–46.6)
HGB BLD-MCNC: 10.1 G/DL (ref 12–15.9)
IMM GRANULOCYTES # BLD AUTO: 0.04 10*3/MM3 (ref 0–0.05)
IMM GRANULOCYTES NFR BLD AUTO: 0.4 % (ref 0–0.5)
LYMPHOCYTES # BLD AUTO: 2.21 10*3/MM3 (ref 0.7–3.1)
LYMPHOCYTES NFR BLD AUTO: 24.8 % (ref 19.6–45.3)
MAGNESIUM SERPL-MCNC: 1.9 MG/DL (ref 1.6–2.4)
MCH RBC QN AUTO: 29.3 PG (ref 26.6–33)
MCHC RBC AUTO-ENTMCNC: 31.7 G/DL (ref 31.5–35.7)
MCV RBC AUTO: 92.5 FL (ref 79–97)
MONOCYTES # BLD AUTO: 0.75 10*3/MM3 (ref 0.1–0.9)
MONOCYTES NFR BLD AUTO: 8.4 % (ref 5–12)
NEUTROPHILS NFR BLD AUTO: 5.7 10*3/MM3 (ref 1.7–7)
NEUTROPHILS NFR BLD AUTO: 64.1 % (ref 42.7–76)
NRBC BLD AUTO-RTO: 0 /100 WBC (ref 0–0.2)
PHOSPHATE SERPL-MCNC: 2.3 MG/DL (ref 2.5–4.5)
PLATELET # BLD AUTO: 329 10*3/MM3 (ref 140–450)
PMV BLD AUTO: 10.1 FL (ref 6–12)
POTASSIUM SERPL-SCNC: 3.6 MMOL/L (ref 3.5–5.2)
RBC # BLD AUTO: 3.45 10*6/MM3 (ref 3.77–5.28)
SODIUM SERPL-SCNC: 137 MMOL/L (ref 136–145)
WBC NRBC COR # BLD AUTO: 8.91 10*3/MM3 (ref 3.4–10.8)

## 2025-08-01 PROCEDURE — 80048 BASIC METABOLIC PNL TOTAL CA: CPT | Performed by: INTERNAL MEDICINE

## 2025-08-01 PROCEDURE — 85025 COMPLETE CBC W/AUTO DIFF WBC: CPT | Performed by: STUDENT IN AN ORGANIZED HEALTH CARE EDUCATION/TRAINING PROGRAM

## 2025-08-01 PROCEDURE — 83735 ASSAY OF MAGNESIUM: CPT | Performed by: STUDENT IN AN ORGANIZED HEALTH CARE EDUCATION/TRAINING PROGRAM

## 2025-08-01 PROCEDURE — 99239 HOSP IP/OBS DSCHRG MGMT >30: CPT | Performed by: INTERNAL MEDICINE

## 2025-08-01 PROCEDURE — 84100 ASSAY OF PHOSPHORUS: CPT | Performed by: STUDENT IN AN ORGANIZED HEALTH CARE EDUCATION/TRAINING PROGRAM

## 2025-08-01 PROCEDURE — 94799 UNLISTED PULMONARY SVC/PX: CPT

## 2025-08-01 PROCEDURE — 99231 SBSQ HOSP IP/OBS SF/LOW 25: CPT | Performed by: NURSE PRACTITIONER

## 2025-08-01 PROCEDURE — 36415 COLL VENOUS BLD VENIPUNCTURE: CPT | Performed by: INTERNAL MEDICINE

## 2025-08-01 RX ORDER — POLYETHYLENE GLYCOL 3350 17 G/17G
17 POWDER, FOR SOLUTION ORAL DAILY
Qty: 14 PACKET | Refills: 0 | Status: SHIPPED | OUTPATIENT
Start: 2025-08-01 | End: 2025-08-01

## 2025-08-01 RX ORDER — PANTOPRAZOLE SODIUM 40 MG/1
40 TABLET, DELAYED RELEASE ORAL DAILY
Status: DISCONTINUED | OUTPATIENT
Start: 2025-08-01 | End: 2025-08-01 | Stop reason: HOSPADM

## 2025-08-01 RX ORDER — PANTOPRAZOLE SODIUM 40 MG/1
40 TABLET, DELAYED RELEASE ORAL DAILY
Qty: 30 TABLET | Refills: 0 | Status: SHIPPED | OUTPATIENT
Start: 2025-08-01 | End: 2025-08-01

## 2025-08-01 RX ORDER — OXYCODONE HYDROCHLORIDE 5 MG/1
5 TABLET ORAL EVERY 6 HOURS PRN
Qty: 12 TABLET | Refills: 0 | Status: SHIPPED | OUTPATIENT
Start: 2025-08-01

## 2025-08-01 RX ORDER — OXYCODONE HYDROCHLORIDE 5 MG/1
5 TABLET ORAL EVERY 6 HOURS PRN
Qty: 12 TABLET | Refills: 0 | Status: SHIPPED | OUTPATIENT
Start: 2025-08-01 | End: 2025-08-01

## 2025-08-01 RX ORDER — PANTOPRAZOLE SODIUM 40 MG/1
40 TABLET, DELAYED RELEASE ORAL DAILY
Qty: 30 TABLET | Refills: 0 | Status: SHIPPED | OUTPATIENT
Start: 2025-08-01 | End: 2025-08-31

## 2025-08-01 RX ORDER — HYDROCODONE BITARTRATE AND ACETAMINOPHEN 5; 325 MG/1; MG/1
1 TABLET ORAL EVERY 4 HOURS PRN
Refills: 0 | Status: DISCONTINUED | OUTPATIENT
Start: 2025-08-01 | End: 2025-08-01 | Stop reason: HOSPADM

## 2025-08-01 RX ORDER — ACETAMINOPHEN 500 MG
1000 TABLET ORAL EVERY 6 HOURS PRN
Qty: 200 TABLET | Refills: 0 | Status: SHIPPED | OUTPATIENT
Start: 2025-08-01 | End: 2025-08-01

## 2025-08-01 RX ORDER — LEVOTHYROXINE SODIUM 50 UG/1
100 TABLET ORAL
Status: DISCONTINUED | OUTPATIENT
Start: 2025-08-01 | End: 2025-08-01 | Stop reason: HOSPADM

## 2025-08-01 RX ORDER — ACETAMINOPHEN 500 MG
1000 TABLET ORAL EVERY 6 HOURS PRN
Qty: 200 TABLET | Refills: 0 | Status: SHIPPED | OUTPATIENT
Start: 2025-08-01

## 2025-08-01 RX ORDER — POLYETHYLENE GLYCOL 3350 17 G/17G
17 POWDER, FOR SOLUTION ORAL DAILY
Qty: 14 PACKET | Refills: 0 | Status: SHIPPED | OUTPATIENT
Start: 2025-08-01 | End: 2025-08-15

## 2025-08-01 RX ADMIN — HYDROCODONE BITARTRATE AND ACETAMINOPHEN 1 TABLET: 5; 325 TABLET ORAL at 10:01

## 2025-08-01 RX ADMIN — HYDROCODONE BITARTRATE AND ACETAMINOPHEN 1 TABLET: 5; 325 TABLET ORAL at 03:21

## 2025-08-01 RX ADMIN — PANTOPRAZOLE SODIUM 40 MG: 40 TABLET, DELAYED RELEASE ORAL at 08:22

## 2025-08-01 RX ADMIN — LEVOTHYROXINE SODIUM 100 MCG: 50 TABLET ORAL at 08:22

## 2025-08-01 RX ADMIN — POTASSIUM & SODIUM PHOSPHATES POWDER PACK 280-160-250 MG 1 PACKET: 280-160-250 PACK at 08:22

## 2025-08-01 RX ADMIN — POTASSIUM & SODIUM PHOSPHATES POWDER PACK 280-160-250 MG 1 PACKET: 280-160-250 PACK at 12:02

## 2025-08-01 NOTE — PLAN OF CARE
Goal Outcome Evaluation:  Plan of Care Reviewed With: patient        Progress: improving  Outcome Evaluation: pt tolerating clear liquid diet, no nausea, pt had BM this shift. pain treated x1. pt did not verbalize any new complaints or concerns. abd dressing remains intact with abd binder on.Sandy Ibrahim RN

## 2025-08-01 NOTE — SIGNIFICANT NOTE
08/01/25 1150   OTHER   Discipline occupational therapist   Rehab Time/Intention   Session Not Performed other (see comments)  (Pending d/c)

## 2025-08-01 NOTE — PROGRESS NOTES
"POST OP PROGRESS NOTE     Patient Name:  Yumiko Chatman  YOB: 1960  [id number]   LOS: 3 days   3 Days Post-Op            Subjective     Interval History:   VSS, afebrile, pain controlled, tolerating clears, +BMs    Review of Systems:    A complete review of systems was performed and all are negative except what is documented in the HPI.       Objective     Constitutional:  well nourished, no acute distress, appears stated age /65 (BP Location: Right arm, Patient Position: Sitting)   Pulse 79   Temp 97.5 °F (36.4 °C) (Oral)   Resp 18   Ht 157.5 cm (62\")   Wt 90 kg (198 lb 6.6 oz)   LMP  (LMP Unknown)   SpO2 96%   BMI 36.29 kg/m²    Eyes:  anicteric sclerae, moist conjunctivae, no lid lag, PERRLA  ENMT:  oropharynx clear, moist mucous membranes  Neck:   full ROM, trachea midline  Cardiovascular:  heart rate 79, no pedal edema  Respiratory:  respirations even and unlabored  GI:  Abdomen soft, appropriately tender, nondistended     Skin:  warm and dry, normal turgor, no rashes  Psychiatric:  alert and oriented x 4,  cooperative          Results Review:       I reviewed the patient's new clinical results including  CBC, BMP.     WBC   Date Value Ref Range Status   08/01/2025 8.91 3.40 - 10.80 10*3/mm3 Final     RBC   Date Value Ref Range Status   08/01/2025 3.45 (L) 3.77 - 5.28 10*6/mm3 Final     Hemoglobin   Date Value Ref Range Status   08/01/2025 10.1 (L) 12.0 - 15.9 g/dL Final     Hematocrit   Date Value Ref Range Status   08/01/2025 31.9 (L) 34.0 - 46.6 % Final     MCV   Date Value Ref Range Status   08/01/2025 92.5 79.0 - 97.0 fL Final     MCH   Date Value Ref Range Status   08/01/2025 29.3 26.6 - 33.0 pg Final     MCHC   Date Value Ref Range Status   08/01/2025 31.7 31.5 - 35.7 g/dL Final     RDW   Date Value Ref Range Status   08/01/2025 14.3 12.3 - 15.4 % Final     RDW-SD   Date Value Ref Range Status   08/01/2025 48.3 37.0 - 54.0 fl Final     MPV   Date Value Ref Range Status " "  08/01/2025 10.1 6.0 - 12.0 fL Final     Platelets   Date Value Ref Range Status   08/01/2025 329 140 - 450 10*3/mm3 Final     Neutrophil %   Date Value Ref Range Status   08/01/2025 64.1 42.7 - 76.0 % Final     Lymphocyte %   Date Value Ref Range Status   08/01/2025 24.8 19.6 - 45.3 % Final     Monocyte %   Date Value Ref Range Status   08/01/2025 8.4 5.0 - 12.0 % Final     Eosinophil %   Date Value Ref Range Status   08/01/2025 2.1 0.3 - 6.2 % Final     Basophil %   Date Value Ref Range Status   08/01/2025 0.2 0.0 - 1.5 % Final     Immature Grans %   Date Value Ref Range Status   08/01/2025 0.4 0.0 - 0.5 % Final     Neutrophils, Absolute   Date Value Ref Range Status   08/01/2025 5.70 1.70 - 7.00 10*3/mm3 Final     Lymphocytes, Absolute   Date Value Ref Range Status   08/01/2025 2.21 0.70 - 3.10 10*3/mm3 Final     Monocytes, Absolute   Date Value Ref Range Status   08/01/2025 0.75 0.10 - 0.90 10*3/mm3 Final     Eosinophils, Absolute   Date Value Ref Range Status   08/01/2025 0.19 0.00 - 0.40 10*3/mm3 Final     Basophils, Absolute   Date Value Ref Range Status   08/01/2025 0.02 0.00 - 0.20 10*3/mm3 Final     Immature Grans, Absolute   Date Value Ref Range Status   08/01/2025 0.04 0.00 - 0.05 10*3/mm3 Final     nRBC   Date Value Ref Range Status   08/01/2025 0.0 0.0 - 0.2 /100 WBC Final         Basic Metabolic Panel    Sodium Sodium   Date Value Ref Range Status   08/01/2025 137 136 - 145 mmol/L Final   07/31/2025 140 136 - 145 mmol/L Final   07/30/2025 140 136 - 145 mmol/L Final      Potassium Potassium   Date Value Ref Range Status   08/01/2025 3.6 3.5 - 5.2 mmol/L Final   07/31/2025 3.5 3.5 - 5.2 mmol/L Final   07/30/2025 4.7 3.5 - 5.2 mmol/L Final      Chloride Chloride   Date Value Ref Range Status   08/01/2025 103 98 - 107 mmol/L Final   07/31/2025 105 98 - 107 mmol/L Final   07/30/2025 105 98 - 107 mmol/L Final      Bicarbonate No results found for: \"PLASMABICARB\"   BUN BUN   Date Value Ref Range Status " "  08/01/2025 13.2 8.0 - 23.0 mg/dL Final   07/31/2025 20.6 8.0 - 23.0 mg/dL Final   07/30/2025 14.7 8.0 - 23.0 mg/dL Final      Creatinine Creatinine   Date Value Ref Range Status   08/01/2025 0.59 0.57 - 1.00 mg/dL Final   07/31/2025 0.76 0.57 - 1.00 mg/dL Final   07/30/2025 0.70 0.57 - 1.00 mg/dL Final      Calcium Calcium   Date Value Ref Range Status   08/01/2025 8.7 8.6 - 10.5 mg/dL Final   07/31/2025 8.7 8.6 - 10.5 mg/dL Final   07/30/2025 8.7 8.6 - 10.5 mg/dL Final      Glucose      No components found for: \"GLUCOSE.*\"       Lab Results   Component Value Date    GLUCOSE 110 (H) 08/01/2025    BUN 13.2 08/01/2025    CREATININE 0.59 08/01/2025     08/01/2025    K 3.6 08/01/2025     08/01/2025    CALCIUM 8.7 08/01/2025    PROTEINTOT 6.6 07/30/2025    ALBUMIN 4.0 07/30/2025    ALT 33 07/30/2025    AST 22 07/30/2025    ALKPHOS 105 07/30/2025    BILITOT 0.4 07/30/2025    GLOB 2.6 07/30/2025    AGRATIO 1.5 07/30/2025    BCR 22.4 08/01/2025    ANIONGAP 9.3 08/01/2025    EGFR 100.2 08/01/2025       IMAGING:  Imaging Results (Last 72 Hours)       Procedure Component Value Units Date/Time    CT Abdomen Pelvis With Contrast [[id number]] Collected: 07/29/25 1253     Updated: 07/29/25 1302    Narrative:      CT ABDOMEN PELVIS W CONTRAST    Date of Exam: 7/29/2025 12:34 PM EDT    Indication: Eval epigastric abdominal pain and distention and vomiting since last night, elevated lactic acid of 5, elevated white blood cell count of 17.    Comparison: None available    Technique: Axial CT images were obtained of the abdomen and pelvis after the uneventful intravenous administration of iodinated contrast. Reconstructed coronal and sagittal images were also obtained. Automated exposure control and iterative construction   methods were used.      Findings:  The heart size is normal. There is no pericardial effusion. The lung bases are clear.    The liver is enlarged measuring 20 cm in craniocaudal dimension. There is " diffusely decreased density of the liver parenchyma compatible with diffuse fatty steatosis. The portal veins and hepatic veins are patent. There is a small cyst within the left   hepatic lobe of the liver. The gallbladder is present without wall thickening. There is no intrahepatic or extrahepatic biliary ductal dilatation.    The spleen, adrenal glands, and pancreas appear within normal limits.    The kidneys are symmetric in size and enhancement. There is no evidence of urolithiasis or hydronephrosis. There is a small cyst within the lateral aspect of the left kidney. The urinary bladder is fluid-filled without wall thickening.    There is a small sliding-type hiatal hernia. There is a small bowel containing supraumbilical hernia with associated transition point compatible with small bowel obstruction. There is upstream fecalization and small bowel distention. The terminal ileum   and colon is collapsed.    The aorta is normal in caliber without evidence of aneurysm formation. There is no abdominal or pelvic lymphadenopathy. There is degenerative disc disease of the lumbar spine.      Impression:      Impression:  1.Small bowel obstruction with transition point within a supraumbilical hernia. Recommend surgical consultation.  2.Hepatomegaly with diffuse fatty steatosis.        Electronically Signed: Luis Denise MD    7/29/2025 1:00 PM EDT    Workstation ID: [id number]            Medications:    Current Facility-Administered Medications:     acetaminophen (TYLENOL) tablet 650 mg, 650 mg, Oral, Q6H PRN, Rosas Kasper MD    aluminum-magnesium hydroxide-simethicone (MAALOX MAX) 400-400-40 MG/5ML suspension 15 mL, 15 mL, Oral, Q6H PRN, Rosas Kasper MD    artificial tears ophthalmic ointment, , Both Eyes, Q1H PRN, Quintin Velasco MD    benzocaine-menthol (CHLORASEPTIC) lozenge 1 each, 1 lozenge, Mouth/Throat, Q2H PRN, Quintin Velasco MD    Diclofenac Sodium (VOLTAREN) 1 % gel 2 g, 2 g, Topical, 4x Daily PRN,  Quintin Velasco MD    HYDROcodone-acetaminophen (NORCO) 5-325 MG per tablet 1 tablet, 1 tablet, Oral, Q4H PRN, Rosas Kasper MD, 1 tablet at 08/01/25 1001    HYDROmorphone (DILAUDID) injection 0.5 mg, 0.5 mg, Intravenous, Q2H PRN, Quintin Velasco MD, 0.5 mg at 07/31/25 0959    hydrOXYzine (ATARAX) tablet 25 mg, 25 mg, Oral, TID PRN, Rosas Kasper MD    levothyroxine (SYNTHROID, LEVOTHROID) tablet 100 mcg, 100 mcg, Oral, Q AM, Rosas Kasper MD, 100 mcg at 08/01/25 0822    Lidocaine (Anorectal) (LMX 5) 5 % cream cream 1 Application, 1 Application, Topical, TID PRN, Quintin Velasco MD    Lidocaine 4 % 1 patch, 1 patch, Transdermal, Daily PRN, Quintin Velasco MD    melatonin tablet 5 mg, 5 mg, Oral, Nightly PRN, Rosas Kasper MD    ondansetron (ZOFRAN) injection 4 mg, 4 mg, Intravenous, Q4H PRN, Quintin Velasco MD    pantoprazole (PROTONIX) EC tablet 40 mg, 40 mg, Oral, Daily, Rosas Kasper MD, 40 mg at 08/01/25 0822    phenol (CHLORASEPTIC) 1.4 % liquid 1 spray, 1 spray, Mouth/Throat, Q2H PRN, Quintin Velasco MD, 1 spray at 07/31/25 1431    potassium & sodium phosphates (PHOS-NAK) 280-160-250 MG packet 1 packet, 1 packet, Oral, 4x Daily With Meals & Nightly, Rosas Kasper MD, 1 packet at 08/01/25 1202    scopolamine patch 1 mg/72 hr, 1 patch, Transdermal, Once, Quintin Velasco MD    sodium chloride 0.9 % flush 10 mL, 10 mL, Intravenous, PRN, Quintin Velasco MD    [COMPLETED] Insert Peripheral IV, , , Once **AND** sodium chloride 0.9 % flush 10 mL, 10 mL, Intravenous, PRN, Quintin Velasco MD    sodium chloride 0.9 % flush 10 mL, 10 mL, Intravenous, Q12H, Quintin Velasco MD, 10 mL at 07/31/25 2125    sodium chloride 0.9 % flush 10 mL, 10 mL, Intravenous, PRN, Quintin Velasco MD    sodium chloride 0.9 % infusion 40 mL, 40 mL, Intravenous, PRN, Quintin Velasco MD    Assessment & Plan       Incarcerated umbilical hernia    S/P Open incisional hernia repair (5 cm) with underlay biologic mesh  placement    Regular diet  Ok to DC home  Follow up in 2 weeks        Electronically signed by KRISTINE King, 08/01/25, 12:06 PM EDT.

## 2025-08-01 NOTE — DISCHARGE SUMMARY
[facility]         HOSPITALIST  DISCHARGE SUMMARY    Patient Name: Yumiko Chatman    : 1960    MRN: [id number]    Date of Admission: 2025  Date of Discharge:  25  Primary Care Physician: Provider, No Known    Consults       Date and Time Order Name Status Description    2025  2:07 PM Hospitalist (on-call MD unless specified)      2025  2:00 PM Surgery (on-call MD unless specified)              Final Diagnosis:  SBO with transition point with incarcerated umbilical hernia in setting of remote history of umbilical hernia repair  Hx of umbilical hernia repair  Lactic acidosis  Leukocytosis reactive in setting of SBO  Morbid obesity BMI of 36  Mildly elevated transaminases suspect in setting of hepatomegaly and diffuse fatty steatosis   Hypothyroidism  GERD    Hospital Course     Hospital Course:  65 y.o. with history of umbilical hernia repair otherwise no significant abdominal surgeries, has prediabetes and hypothyroidism, had acute onset of abdominal pain found to have incarcerated umbilical hernia.  Patient went to the OR on  for open incisional hernia repair with biologic mesh placement.  She tolerated surgery well, did require monitoring with NG postoperatively and ultimately had resumption of bowel function and tolerated advancing diet.    Discharging with outpatient follow-up with surgery in 2 weeks with Dr. Velasco for further monitoring.    Patient discharged in stable condition with close outpatient follow up. Return precautions and follow up discussed and patient voiced agreement and understanding of treatment plan.     DISCHARGE Follow Up Recommendations for labs and diagnostics:   As above    The patient needs a bedside commode at home for the following reason: the patient is confined to a single room.      CODE STATUS:  Code Status and Medical Interventions: CPR (Attempt to Resuscitate); Full Support   Ordered at: 25 1437     Code Status (Patient  has no pulse and is not breathing):    CPR (Attempt to Resuscitate)     Medical Interventions (Patient has pulse or is breathing):    Full Support           Day of Discharge     Vital Signs:  Temp:  [97.5 °F (36.4 °C)-98.8 °F (37.1 °C)] 97.5 °F (36.4 °C)  Heart Rate:  [76-92] 79  Resp:  [17-18] 18  BP: (121-140)/(58-74) 136/65    Physical Exam    Gen: awake, resting in bed, conversant  Resp: breathing comfortably on ra  CV: RRR  Dressing intact, no acute drainage,       Discharge Details        Discharge Medications        New Medications        Instructions Start Date   oxyCODONE 5 MG immediate release tablet  Commonly known as: Roxicodone   5 mg, Oral, Every 6 Hours PRN      pantoprazole 40 MG EC tablet  Commonly known as: PROTONIX   40 mg, Oral, Daily      polyethylene glycol 17 g packet  Commonly known as: MIRALAX   17 g, Oral, Daily             Changes to Medications        Instructions Start Date   acetaminophen 500 MG tablet  Commonly known as: TYLENOL  What changed: You were already taking a medication with the same name, and this prescription was added. Make sure you understand how and when to take each.   1,000 mg, Oral, Every 6 Hours PRN      TYLENOL 500 MG tablet  Generic drug: acetaminophen  What changed: Another medication with the same name was added. Make sure you understand how and when to take each.   500 mg, As Needed             Continue These Medications        Instructions Start Date   aspirin 81 MG EC tablet   81 mg, Oral, As Needed      Farxiga 5 MG tablet tablet  Generic drug: dapagliflozin   5 mg, Oral, Daily      Synthroid 100 MCG tablet  Generic drug: levothyroxine   100 mcg, Daily                 Discharge Disposition:  Home or Self Care    Diet: continue on diet / dietary restrictions from hospitalization   Dietary Orders (From admission, onward)       Start     Ordered    08/01/25 1104  Diet: Regular/House; Fluid Consistency: Thin (IDDSI 0)  Diet Effective Now        References:     Diet Order Definitions   Question Answer Comment   Diets: Regular/House    Fluid Consistency: Thin (IDDSI 0)        08/01/25 1103                    Discharge Activity: advance as tolerated      Additional Instructions for the Follow-ups that You Need to Schedule       Discharge Follow-up with PCP   As directed       Currently Documented PCP:    Provider, No Known    PCP Phone Number:    None     Follow Up Details: 5 days hospital f/u                Pertinent  and/or Most Recent Results       LAB RESULTS:      Lab 08/01/25 0445 07/31/25  0441 07/30/25  0325 07/29/25  2340 07/29/25  1843 07/29/25  1254 07/29/25  1103   WBC 8.91 9.53 11.20*  --   --   --  17.88*   HEMOGLOBIN 10.1* 10.4* 11.2*  --   --   --  14.7   HEMATOCRIT 31.9* 33.0* 36.0  --   --   --  45.6   PLATELETS 329 310 364  --   --   --  423   NEUTROS ABS 5.70 5.76 9.76*  --   --   --  16.55*   IMMATURE GRANS (ABS) 0.04 0.02 0.02  --   --   --  0.05   LYMPHS ABS 2.21 2.69 0.74  --   --   --  0.68*   MONOS ABS 0.75 0.93* 0.65  --   --   --  0.55   EOS ABS 0.19 0.10 0.00  --   --   --  0.00   MCV 92.5 93.0 92.1  --   --   --  91.4   LACTATE  --   --   --  4.1* 3.6* 5.1* 5.1*   PROTIME  --   --  14.0  --   --   --   --          Lab 08/01/25 0445 07/31/25 0441 07/30/25  0325 07/29/25  1103   SODIUM 137 140 140 141   POTASSIUM 3.6 3.5 4.7 5.0   CHLORIDE 103 105 105 100   CO2 24.7 26.2 25.6 22.6   ANION GAP 9.3 8.8 9.4 18.4*   BUN 13.2 20.6 14.7 17.2   CREATININE 0.59 0.76 0.70 0.90   EGFR 100.2 87.1 96.1 71.1   GLUCOSE 110* 110* 152* 166*   CALCIUM 8.7 8.7 8.7 10.5   MAGNESIUM 1.9 2.1 2.1  --    PHOSPHORUS 2.3* 1.9* 2.2*  --    HEMOGLOBIN A1C  --   --  6.10*  --    TSH  --   --   --  0.669         Lab 07/30/25  0325 07/29/25  1103   TOTAL PROTEIN 6.6 8.5   ALBUMIN 4.0 5.0   GLOBULIN 2.6 3.5   ALT (SGPT) 33 50*   AST (SGOT) 22 39*   BILIRUBIN 0.4 0.3   ALK PHOS 105 151*   LIPASE  --  34         Lab 07/30/25  0325   PROTIME 14.0   INR 1.03                  Brief Urine Lab Results  (Last result in the past 365 days)        Color   Clarity   Blood   Leuk Est   Nitrite   Protein   CREAT   Urine HCG        07/29/25 1155 Yellow   Clear   Negative   Trace   Negative   Trace                 Microbiology Results (last 10 days)       ** No results found for the last 240 hours. **            RADIOLOGY:    CT Abdomen Pelvis With Contrast  Result Date: 7/29/2025  Impression: Impression: 1.Small bowel obstruction with transition point within a supraumbilical hernia. Recommend surgical consultation. 2.Hepatomegaly with diffuse fatty steatosis. Electronically Signed: Luis Denise MD  7/29/2025 1:00 PM EDT  Workstation ID: [id number]                  Labs Pending at Discharge:        Time spent on Discharge including face to face service:  33 minutes

## 2025-08-04 ENCOUNTER — READMISSION MANAGEMENT (OUTPATIENT)
Dept: CALL CENTER | Facility: HOSPITAL | Age: 65
End: 2025-08-04
Payer: COMMERCIAL

## 2025-08-15 ENCOUNTER — OFFICE VISIT (OUTPATIENT)
Dept: SURGERY | Facility: CLINIC | Age: 65
End: 2025-08-15
Payer: MEDICARE

## 2025-08-15 VITALS — HEIGHT: 62 IN | BODY MASS INDEX: 38.42 KG/M2 | WEIGHT: 208.8 LBS

## 2025-08-15 DIAGNOSIS — Z98.890 POST-OPERATIVE STATE: Primary | ICD-10-CM

## (undated) DEVICE — SUT PDS 1 XLH LP 99IN Z881G

## (undated) DEVICE — STERILE POLYISOPRENE POWDER-FREE SURGICAL GLOVES WITH EMOLLIENT COATING: Brand: PROTEXIS

## (undated) DEVICE — ELECTRD BLD EZ CLN MOD XLNG 2.75IN

## (undated) DEVICE — DRSNG SURG AQUACEL AG/ADVNTGE 9X15CM 3.5X6IN

## (undated) DEVICE — STERILE POLYISOPRENE POWDER-FREE SURGICAL GLOVES: Brand: PROTEXIS

## (undated) DEVICE — GOWN,SIRUS,POLYRNF,BRTHSLV,2XL,18/CS: Brand: MEDLINE

## (undated) DEVICE — SLV SCD KN/LEN ADJ EXPRSS BLENDED MD 1P/U

## (undated) DEVICE — PENCL SMOKE/EVAC MEGADYNE TELESCP 10FT

## (undated) DEVICE — SUT MNCRYL PLS ANTIB UD 4/0 PS2 18IN

## (undated) DEVICE — ADHS SKIN SURG TISS VISC PREMIERPRO EXOFIN HI/VISC 1ML

## (undated) DEVICE — THE STERILE LIGHT HANDLE COVER IS USED WITH STERIS SURGICAL LIGHTING AND VISUALIZATION SYSTEMS.

## (undated) DEVICE — INTENDED FOR TISSUE SEPARATION, AND OTHER PROCEDURES THAT REQUIRE A SHARP SURGICAL BLADE TO PUNCTURE OR CUT.: Brand: BARD-PARKER ® CARBON RIB-BACK BLADES

## (undated) DEVICE — SYR LL TP 10ML STRL

## (undated) DEVICE — GLV SURG SENSICARE PI ORTHO SZ6.5 LF STRL

## (undated) DEVICE — SUT VIC 3/0 SH 27IN J416H

## (undated) DEVICE — MAJOR-LF: Brand: MEDLINE INDUSTRIES, INC.

## (undated) DEVICE — SUT PDS 1 CT1 36IN Z347H

## (undated) DEVICE — SUT PDS2 0 CT1 27IN Z340H MF VIL

## (undated) DEVICE — PROXIMATE RH ROTATING HEAD SKIN STAPLERS (35 WIDE) CONTAINS 35 STAINLESS STEEL STAPLES: Brand: PROXIMATE